# Patient Record
Sex: MALE | Race: WHITE
[De-identification: names, ages, dates, MRNs, and addresses within clinical notes are randomized per-mention and may not be internally consistent; named-entity substitution may affect disease eponyms.]

---

## 2017-10-27 ENCOUNTER — HOSPITAL ENCOUNTER (OUTPATIENT)
Dept: HOSPITAL 45 - C.EDA | Age: 82
Setting detail: OBSERVATION
LOS: 3 days | Discharge: HOME | End: 2017-10-30
Attending: FAMILY MEDICINE | Admitting: FAMILY MEDICINE
Payer: COMMERCIAL

## 2017-10-27 VITALS
WEIGHT: 207.45 LBS | HEIGHT: 67 IN | BODY MASS INDEX: 32.56 KG/M2 | BODY MASS INDEX: 32.56 KG/M2 | HEIGHT: 67 IN | WEIGHT: 207.45 LBS

## 2017-10-27 VITALS
OXYGEN SATURATION: 96 % | HEART RATE: 89 BPM | DIASTOLIC BLOOD PRESSURE: 92 MMHG | SYSTOLIC BLOOD PRESSURE: 170 MMHG | TEMPERATURE: 97.52 F

## 2017-10-27 DIAGNOSIS — I48.91: ICD-10-CM

## 2017-10-27 DIAGNOSIS — Z79.899: ICD-10-CM

## 2017-10-27 DIAGNOSIS — I10: ICD-10-CM

## 2017-10-27 DIAGNOSIS — N40.0: ICD-10-CM

## 2017-10-27 DIAGNOSIS — R07.2: Primary | ICD-10-CM

## 2017-10-27 DIAGNOSIS — Z79.82: ICD-10-CM

## 2017-10-27 DIAGNOSIS — F03.90: ICD-10-CM

## 2017-10-27 DIAGNOSIS — Z88.0: ICD-10-CM

## 2017-10-27 DIAGNOSIS — Z79.01: ICD-10-CM

## 2017-10-27 DIAGNOSIS — R79.89: ICD-10-CM

## 2017-10-27 LAB
ALBUMIN/GLOB SERPL: 1.1 {RATIO} (ref 0.9–2)
ALP SERPL-CCNC: 64 U/L (ref 45–117)
ALT SERPL-CCNC: 18 U/L (ref 12–78)
ANION GAP SERPL CALC-SCNC: 9 MMOL/L (ref 3–11)
APPEARANCE UR: CLEAR
AST SERPL-CCNC: 21 U/L (ref 15–37)
BASOPHILS # BLD: 0.04 K/UL (ref 0–0.2)
BASOPHILS NFR BLD: 0.5 %
BILIRUB UR-MCNC: (no result) MG/DL
BUN SERPL-MCNC: 21 MG/DL (ref 7–18)
BUN/CREAT SERPL: 16.4 (ref 10–20)
CALCIUM SERPL-MCNC: 8.5 MG/DL (ref 8.5–10.1)
CHLORIDE SERPL-SCNC: 105 MMOL/L (ref 98–107)
CO2 SERPL-SCNC: 27 MMOL/L (ref 21–32)
COLOR UR: YELLOW
COMPLETE: YES
CREAT CL PREDICTED SERPL C-G-VRATE: 46.7 ML/MIN
CREAT SERPL-MCNC: 1.26 MG/DL (ref 0.6–1.4)
EOSINOPHIL NFR BLD AUTO: 217 K/UL (ref 130–400)
GLOBULIN SER-MCNC: 3 GM/DL (ref 2.5–4)
GLUCOSE SERPL-MCNC: 102 MG/DL (ref 70–99)
HCT VFR BLD CALC: 37.1 % (ref 42–52)
IG%: 0.2 %
IMM GRANULOCYTES NFR BLD AUTO: 21.4 %
INR PPP: 1.1 (ref 0.9–1.1)
LYMPHOCYTES # BLD: 1.83 K/UL (ref 1.2–3.4)
MAGNESIUM SERPL-MCNC: 2 MG/DL (ref 1.8–2.4)
MANUAL MICROSCOPIC REQUIRED?: NO
MCH RBC QN AUTO: 31.2 PG (ref 25–34)
MCHC RBC AUTO-ENTMCNC: 33.4 G/DL (ref 32–36)
MCV RBC AUTO: 93.2 FL (ref 80–100)
MONOCYTES NFR BLD: 9.5 %
NEUTROPHILS # BLD AUTO: 2.8 %
NEUTROPHILS NFR BLD AUTO: 65.6 %
NITRITE UR QL STRIP: (no result)
PH UR STRIP: 5.5 [PH] (ref 4.5–7.5)
PMV BLD AUTO: 10.8 FL (ref 7.4–10.4)
POTASSIUM SERPL-SCNC: 3.7 MMOL/L (ref 3.5–5.1)
PROTHROMBIN TIME: 12 SECONDS (ref 9–12)
RBC # BLD AUTO: 3.98 M/UL (ref 4.7–6.1)
REVIEW REQ?: NO
SODIUM SERPL-SCNC: 141 MMOL/L (ref 136–145)
SP GR UR STRIP: 1.02 (ref 1–1.03)
TSH SERPL-ACNC: 5.15 UIU/ML (ref 0.3–4.5)
URINE BILL WITH OR WITHOUT MIC: (no result)
UROBILINOGEN UR-MCNC: (no result) MG/DL
WBC # BLD AUTO: 8.54 K/UL (ref 4.8–10.8)
ZZUR CULT IF INDIC CLEAN CATCH: NO

## 2017-10-27 NOTE — DIAGNOSTIC IMAGING REPORT
CHEST ONE VIEW PORTABLE



CLINICAL HISTORY: chest pain dyspnea



COMPARISON STUDY:  No previous studies for comparison.



FINDINGS: Mild cardiomegaly. The lungs are clear. Diaphragms are smooth.

Costophrenic angles are sharp. 



IMPRESSION:  Mild cardia megaly. Otherwise negative study. 











The above report was generated using voice recognition software.  It may contain

grammatical, syntax or spelling errors.









Electronically signed by:  Binu Raines M.D.

10/27/2017 5:35 PM



Dictated Date/Time:  10/27/2017 5:35 PM

## 2017-10-27 NOTE — HISTORY AND PHYSICAL
History & Physical


Date & Time of Service:


Oct 27, 2017 at 22:50


Chief Complaint:


Chest Pain


Primary Care Physician:


No Doctor, Assigned


History of Present Illness


Source:  patient, family


84-year-old male with a past medical history of dementia, atrial fibrillation, 

hypertension, BPH presented to the ER with complaints of intermittent chest 

pain that started this morning.


The patient stated that the pain was in the center of his chest with no 

radiation, about a 5/10 in severity, intermittent, sharp in nature.  It was 

associated with shortness of breath but he denied any palpitations, dizziness 

or lightheadedness, diaphoresis, nausea or vomiting.


He states that he occasionally gets heartburn and uses [place] for it.





Past Medical/Surgical History


Atrial fibrillation


Dementia


BPH





Social History


Smoking Status:  Former Smoker


Marital Status:  


Occupational Status:  retired





Allergies


Coded Allergies:  


     Penicillins (Verified  Allergy, Intermediate, SWELLING, 10/27/17)





Home Medications


Scheduled


Apixaban (Eliquis), 2.5 MG PO BID


Aspirin (Aspirin Ec), 81 MG PO DAILY


Atorvastatin (Lipitor), 10 MG PO DAILY


Cephalexin (Keflex), 1 CAP PO BID


Clonidine Hcl (Catapres), 1 TAB PO QID


Diltiazem Hcl Ext Rel (Tiazac), 360 MG PO DAILY


Donepezil Hydrochloride (Aricept), 10 MG PO HS


Finasteride (Proscar), 5 MG PO DAILY


Furosemide (Lasix), 40 MG PO DAILY


Memantine (Namenda), 10 MG PO BID


Nebivolol Hcl (Bystolic), 20 MG PO DAILY


Nitroglycerin (Nitrostat), 0.4 MG UT PRN


Terazosin (Hytrin), 5 MG PO HS





Review of Systems


Constitutional:  No fever, No chills


Eyes:  No worsening of vision


ENT:  No hearing loss


Respiratory:  No cough, No sputum, No shortness of breath


Cardiovascular:  + chest pain (intermittently), No edema, No palpitations


Abdomen:  No pain, No nausea, No vomiting, No diarrhea


Genitourinary - Male:  No hematuria, No dysuria


Neurologic:  + problem reported (dementia)


Integumentary:  No rash


Allergic / Immunologic:  No environmental allergies





Physical Exam


Vital Signs











  Date Time  Temp Pulse Resp B/P (MAP) Pulse Ox O2 Delivery O2 Flow Rate FiO2


 


10/27/17 22:00  82 20 163/75 97 Room Air  


 


10/27/17 21:07  88      


 


10/27/17 21:00  81 14 170/96 98 Room Air  


 


10/27/17 20:00  60 21 138/91 97 Room Air  


 


10/27/17 19:37  64 17 146/81 97 Room Air  


 


10/27/17 18:30  73 23 142/83 97 Room Air  


 


10/27/17 17:25  54 15 128/66 98 Room Air  


 


10/27/17 17:02  44      


 


10/27/17 16:37 36.5 45 22 107/53 97 Room Air  


 


10/27/17 16:37     97 Room Air  


 


10/27/17 16:37     97 Room Air  








General Appearance:  WD/WN, no apparent distress


ENT:  hearing grossly normal


Neck:  supple


Respiratory/Chest:  lungs clear, normal breath sounds, no respiratory distress


Cardiovascular:  regular rate, rhythm


Abdomen/GI:  normal bowel sounds, soft


Extremities/Musculoskelatal:  no pedal edema


Neurologic/Psych:  alert, normal mood/affect, oriented x 3


Skin:  normal color





Diagnostics


Laboratory Results





Results Past 24 Hours








Test


  10/27/17


16:52 10/27/17


18:15 Range/Units


 


 


White Blood Count 8.54  4.8-10.8  K/uL


 


Red Blood Count 3.98  4.7-6.1  M/uL


 


Hemoglobin 12.4  14.0-18.0  g/dL


 


Hematocrit 37.1  42-52  %


 


Mean Corpuscular Volume 93.2    fL


 


Mean Corpuscular Hemoglobin 31.2  25-34  pg


 


Mean Corpuscular Hemoglobin


Concent 33.4


  


  32-36  g/dl


 


 


Platelet Count 217  130-400  K/uL


 


Mean Platelet Volume 10.8  7.4-10.4  fL


 


Neutrophils (%) (Auto) 65.6   %


 


Lymphocytes (%) (Auto) 21.4   %


 


Monocytes (%) (Auto) 9.5   %


 


Eosinophils (%) (Auto) 2.8   %


 


Basophils (%) (Auto) 0.5   %


 


Neutrophils # (Auto) 5.60  1.4-6.5  K/uL


 


Lymphocytes # (Auto) 1.83  1.2-3.4  K/uL


 


Monocytes # (Auto) 0.81  0.11-0.59  K/uL


 


Eosinophils # (Auto) 0.24  0-0.5  K/uL


 


Basophils # (Auto) 0.04  0-0.2  K/uL


 


RDW Standard Deviation 45.7  36.4-46.3  fL


 


RDW Coefficient of Variation 13.5  11.5-14.5  %


 


Immature Granulocyte % (Auto) 0.2   %


 


Immature Granulocyte # (Auto) 0.02  0.00-0.02  K/uL


 


Prothrombin Time


  12.0


  


  9.0-12.0


SECONDS


 


Prothromb Time International


Ratio 1.1


  


  0.9-1.1  


 


 


Sodium Level 141  136-145  mmol/L


 


Potassium Level 3.7  3.5-5.1  mmol/L


 


Chloride Level 105    mmol/L


 


Carbon Dioxide Level 27  21-32  mmol/L


 


Anion Gap 9.0  3-11  mmol/L


 


Blood Urea Nitrogen 21  7-18  mg/dl


 


Creatinine


  1.26


  


  0.60-1.40


mg/dl


 


Est Creatinine Clear Calc


Drug Dose 46.7


  


   ml/min


 


 


Estimated GFR (


American) 60.3


  


   


 


 


Estimated GFR (Non-


American 52.0


  


   


 


 


BUN/Creatinine Ratio 16.4  10-20  


 


Random Glucose 102  70-99  mg/dl


 


Calcium Level 8.5  8.5-10.1  mg/dl


 


Magnesium Level 2.0  1.8-2.4  mg/dl


 


Total Bilirubin 0.4  0.2-1  mg/dl


 


Aspartate Amino Transf


(AST/SGOT) 21


  


  15-37  U/L


 


 


Alanine Aminotransferase


(ALT/SGPT) 18


  


  12-78  U/L


 


 


Alkaline Phosphatase 64    U/L


 


Troponin I < 0.015  0-0.045  ng/ml


 


Pro-B-Type Natriuretic Peptide 8998  0-1800  pg/ml


 


Total Protein 6.4  6.4-8.2  gm/dl


 


Albumin 3.4  3.4-5.0  gm/dl


 


Globulin 3.0  2.5-4.0  gm/dl


 


Albumin/Globulin Ratio 1.1  0.9-2  


 


Thyroid Stimulating Hormone


(TSH) 5.150


  


  0.300-4.500


uIu/ml











Diagnostic Radiology


CHEST ONE VIEW PORTABLE





CLINICAL HISTORY: chest pain dyspnea





COMPARISON STUDY:  No previous studies for comparison.





FINDINGS: Mild cardiomegaly. The lungs are clear. Diaphragms are smooth.


Costophrenic angles are sharp. 





IMPRESSION:  Mild cardia megaly. Otherwise negative study. 

















The above report was generated using voice recognition software.  It may contain


grammatical, syntax or spelling errors.














Electronically signed by:  Binu Raines M.D.


10/27/2017 5:35 PM





Dictated Date/Time:  10/27/2017 5:35 PM





EKG


Atrial fibrillation with slow ventricular response


Septal infarct , age undetermined


Abnormal ECG


No previous ECGs available





Impression


Assessment and Plan





84-year-old male with a past medical history of dementia, atrial fibrillation, 

hypertension, BPH presented to the ER with complaints of intermittent chest 

pain that started this morning.


Admitted to telemetry for chest pain rule out.





Precordial chest pain:


- EKG suggestive of atrial fibrillation


- Chest x-ray with mild cardiomegaly


- Initial troponin negative, trended every 8 hours


- Echo ordered


- Consider stress test


- Lipid panel ordered





A fibrillation:


- Continue diltiazem, Bystolic


- Anticoagulation with Eliquis 2.5 mg twice a day





Elevated TSH:


No documented history of hypothyroidism


- TSH at 5.15, free T4 ordered with a.m. labs





Dementia:


- Continue Aricept and Namenda





Hypertension:


- Continue clonidine


Takes Lasix for lower extremity swelling





BPH:


- Continue prazosin and finasteride








Resident Physician Supervision Note:





I was present with Dr. Ragland during the history and exam. I discussed the case 

with the resident and agree with the findings and plan as documented in the 

note.  Any exceptions or clarifications are listed here: 





83 y/o M Hx dementia, AF, HTN - anticoagulated with Eliquis - presents with 

intermittent CP x 1 day


Mild CHF is present on admission as well - does not have history of and AF is 

rate controlled - it is noted that full history may not be complete as we await 

outside records - Lasix appears on his med list reportedly for LE edema





OE


AAO x 2 


S1,2 irr


CTA


NT, ND


No CCE





P:


AF is rate controlled on admission and pt is anticoagulated 


We will obtain serial enzymes to r/o MI - monitor overnight on telemetry


We are pending records from [facility] at time of admission


Echo ordered due to CHF which may be of new onset - IV Lasix was provided in ER.





Documented By:  Calos Pérez





Level of Care


Telemetry





Advanced Directives


Existing Advance Directive:  No





Resuscitation Status


DO NOT RESUSCITATE





VTE Prophylaxis


VTE Risk Assessment Done? Y/N:  Yes


Risk Level:  Moderate


Given or contraindicated:  Other Anticoagulation (apixaban)





Resident Tracking


Resident Involvement:  Resident Care Provided


Care Provided:  Adult [facility]

## 2017-10-27 NOTE — EMERGENCY ROOM VISIT NOTE
History


Report prepared by El:  Anastacio Fuentes


Under the Supervision of:  KAYA ToddO.


First contact with patient:  16:41


Chief Complaint:  CHEST PAIN


Stated Complaint:  CHEST PAIN





History of Present Illness


The patient is an 84 year old male who presents to the Emergency Room with 

complaints of on and off sharp central chest pain that started around 0500 this 

morning. The patient additionally states that he was dizzy, and he was unable 

to walk his dog due to the pain. He states that he took aspirin this morning, 

and this helped as well as taking a nitro. He states that the pain does not go 

into his back or arms, and it does not change with position. The patient states 

that a couple of months ago he had a similar episode, and nothing was found. He 

states that he currently has a cold. He denies any recent change in his bowel 

movement or trouble urinating. The patient additionally states that he has not 

been doing any heavy lifting recently, and he states that occasionally gets 

heart burn, and it somewhat feels similar. Pt denies headache, change in vision

, fevers, shortness of breath, nausea, vomiting, diarrhea, pain with urination, 

and melena. The patient is a poor historian.





   Source of History:  patient


   Onset:  0500 this morning


   Position:  chest


   Quality:  sharp


   Timing:  other (on and off)


   Modifying Factors (Relieving):  other (aspirin and nitro)





Review of Systems


See HPI for pertinent positives & negatives. A total of 10 systems reviewed and 

were otherwise negative.





Past Medical & Surgical


Medical Problems:


(1) Dementia


(2) Precordial chest pain








Social History


Marital Status:  


Housing Status:  lives with family


Occupation Status:  retired





Current/Historical Medications


Scheduled


Apixaban (Eliquis), 2.5 MG PO BID


Aspirin (Aspirin Ec), 81 MG PO DAILY


Atorvastatin (Lipitor), 10 MG PO DAILY


Cephalexin (Keflex), 1 CAP PO BID


Clonidine Hcl (Catapres), 1 TAB PO QID


Diltiazem Hcl Ext Rel (Tiazac), 360 MG PO DAILY


Donepezil Hydrochloride (Aricept), 10 MG PO HS


Finasteride (Proscar), 5 MG PO DAILY


Furosemide (Lasix), 40 MG PO DAILY


Memantine (Namenda), 10 MG PO BID


Nebivolol Hcl (Bystolic), 20 MG PO DAILY


Nitroglycerin (Nitrostat), 0.4 MG UT PRN


Terazosin (Hytrin), 5 MG PO HS





Allergies


Coded Allergies:  


     Penicillins (Verified  Allergy, Intermediate, SWELLING, 10/27/17)





Physical Exam


Vital Signs











  Date Time  Temp Pulse Resp B/P (MAP) Pulse Ox O2 Delivery O2 Flow Rate FiO2


 


10/27/17 22:00  82 20 163/75 97 Room Air  


 


10/27/17 21:07  88      


 


10/27/17 21:00  81 14 170/96 98 Room Air  


 


10/27/17 20:00  60 21 138/91 97 Room Air  


 


10/27/17 19:37  64 17 146/81 97 Room Air  


 


10/27/17 18:30  73 23 142/83 97 Room Air  


 


10/27/17 17:25  54 15 128/66 98 Room Air  


 


10/27/17 17:02  44      


 


10/27/17 16:37 36.5 45 22 107/53 97 Room Air  


 


10/27/17 16:37     97 Room Air  


 


10/27/17 16:37     97 Room Air  











Physical Exam


GENERAL: alert, well appearing, well nourished, no distress, non-toxic 


EYE EXAM: normal conjunctiva, PERRL and EOM's grossly intact


OROPHARYNX: no exudate, no erythema, lips, buccal mucosa, and tongue normal and 

mucous membranes are moist


NECK: supple, no nuchal rigidity, no adenopathy, non-tender


LUNGS: Clear to auscultation. Normal chest wall mechanics


HEART: Slow and irregular.


CHEST: Minimal reproducible chest pain along the right sternal border.


ABDOMEN: abdomen soft, non-tender, normo-active bowel sounds, no masses, no 

rebound or guarding. 


BACK: Back is symmetrical on inspection and there is no deformity, no midline 

tenderness, no CVA tenderness. 


SKIN: no rashes and no bruising 


UPPER EXTREMITIES: upper extremities are grossly normal. 


LOWER EXTREMITIES: Trace to 1+ bilateral edema 


NEURO EXAM: Normal sensorium, cranial nerves II-XII grossly intact, normal 

speech,  no gross weakness of arms, no gross weakness of legs. Gross sensation 

intact.





Medical Decision & Procedures


ER Provider


Diagnostic Interpretation:


Radiology results have been interpreted by the radiologist and reviewed by me.








CHEST ONE VIEW PORTABLE





CLINICAL HISTORY: chest pain dyspnea





COMPARISON STUDY:  No previous studies for comparison.





FINDINGS: Mild cardiomegaly. The lungs are clear. Diaphragms are smooth.


Costophrenic angles are sharp. 





IMPRESSION:  Mild cardia megaly. Otherwise negative study. 








The above report was generated using voice recognition software.  It may contain


grammatical, syntax or spelling errors.








Electronically signed by:  Binu Raines M.D.


10/27/2017 5:35 PM





Dictated Date/Time:  10/27/2017 5:35 PM





Laboratory Results


10/27/17 16:52








Red Blood Count 3.98, Mean Corpuscular Volume 93.2, Mean Corpuscular Hemoglobin 

31.2, Mean Corpuscular Hemoglobin Concent 33.4, Mean Platelet Volume 10.8, 

Neutrophils (%) (Auto) 65.6, Lymphocytes (%) (Auto) 21.4, Monocytes (%) (Auto) 

9.5, Eosinophils (%) (Auto) 2.8, Basophils (%) (Auto) 0.5, Neutrophils # (Auto) 

5.60, Lymphocytes # (Auto) 1.83, Monocytes # (Auto) 0.81, Eosinophils # (Auto) 

0.24, Basophils # (Auto) 0.04





10/27/17 16:52

















Test


  10/27/17


16:52 10/27/17


22:17


 


White Blood Count


  8.54 K/uL


(4.8-10.8) 


 


 


Red Blood Count


  3.98 M/uL


(4.7-6.1) 


 


 


Hemoglobin


  12.4 g/dL


(14.0-18.0) 


 


 


Hematocrit 37.1 % (42-52)  


 


Mean Corpuscular Volume


  93.2 fL


() 


 


 


Mean Corpuscular Hemoglobin


  31.2 pg


(25-34) 


 


 


Mean Corpuscular Hemoglobin


Concent 33.4 g/dl


(32-36) 


 


 


Platelet Count


  217 K/uL


(130-400) 


 


 


Mean Platelet Volume


  10.8 fL


(7.4-10.4) 


 


 


Neutrophils (%) (Auto) 65.6 %  


 


Lymphocytes (%) (Auto) 21.4 %  


 


Monocytes (%) (Auto) 9.5 %  


 


Eosinophils (%) (Auto) 2.8 %  


 


Basophils (%) (Auto) 0.5 %  


 


Neutrophils # (Auto)


  5.60 K/uL


(1.4-6.5) 


 


 


Lymphocytes # (Auto)


  1.83 K/uL


(1.2-3.4) 


 


 


Monocytes # (Auto)


  0.81 K/uL


(0.11-0.59) 


 


 


Eosinophils # (Auto)


  0.24 K/uL


(0-0.5) 


 


 


Basophils # (Auto)


  0.04 K/uL


(0-0.2) 


 


 


RDW Standard Deviation


  45.7 fL


(36.4-46.3) 


 


 


RDW Coefficient of Variation


  13.5 %


(11.5-14.5) 


 


 


Immature Granulocyte % (Auto) 0.2 %  


 


Immature Granulocyte # (Auto)


  0.02 K/uL


(0.00-0.02) 


 


 


Prothrombin Time


  12.0 SECONDS


(9.0-12.0) 


 


 


Prothromb Time International


Ratio 1.1 (0.9-1.1) 


  


 


 


Anion Gap


  9.0 mmol/L


(3-11) 


 


 


Est Creatinine Clear Calc


Drug Dose 46.7 ml/min 


  


 


 


Estimated GFR (


American) 60.3 


  


 


 


Estimated GFR (Non-


American 52.0 


  


 


 


BUN/Creatinine Ratio 16.4 (10-20)  


 


Calcium Level


  8.5 mg/dl


(8.5-10.1) 


 


 


Magnesium Level


  2.0 mg/dl


(1.8-2.4) 


 


 


Total Bilirubin


  0.4 mg/dl


(0.2-1) 


 


 


Aspartate Amino Transf


(AST/SGOT) 21 U/L (15-37) 


  


 


 


Alanine Aminotransferase


(ALT/SGPT) 18 U/L (12-78) 


  


 


 


Alkaline Phosphatase


  64 U/L


() 


 


 


Troponin I


  < 0.015 ng/ml


(0-0.045) 


 


 


Pro-B-Type Natriuretic Peptide


  8998 pg/ml


(0-1800) 


 


 


Total Protein


  6.4 gm/dl


(6.4-8.2) 


 


 


Albumin


  3.4 gm/dl


(3.4-5.0) 


 


 


Globulin


  3.0 gm/dl


(2.5-4.0) 


 


 


Albumin/Globulin Ratio 1.1 (0.9-2)  


 


Thyroid Stimulating Hormone


(TSH) 5.150 uIu/ml


(0.300-4.500) 


 


 


Urine Color  YELLOW 


 


Urine Appearance  CLEAR (CLEAR) 


 


Urine pH  5.5 (4.5-7.5) 


 


Urine Specific Gravity


  


  1.019


(1.000-1.030)


 


Urine Protein  TRACE (NEG) 


 


Urine Glucose (UA)  NEG (NEG) 


 


Urine Ketones  TRACE (NEG) 


 


Urine Occult Blood  NEG (NEG) 


 


Urine Nitrite  NEG (NEG) 


 


Urine Bilirubin  NEG (NEG) 


 


Urine Urobilinogen  NEG (NEG) 


 


Urine Leukocyte Esterase  TRACE (NEG) 


 


Urine WBC (Auto)  1-5 /hpf (0-5) 


 


Urine RBC (Auto)  0-4 /hpf (0-4) 


 


Urine Hyaline Casts (Auto)  1-5 /lpf (0-5) 


 


Urine Epithelial Cells (Auto)


  


  10-20 /lpf


(0-5)


 


Urine Bacteria (Auto)  NEG (NEG) 








Laboratory results per my review.





Medications Administered











 Medications


  (Trade)  Dose


 Ordered  Sig/Yobani


 Route  Start Time


 Stop Time Status Last Admin


Dose Admin


 


 Al Hydroxide/Mg


 Hydroxide


  (Maalox Susp)  15 ml  NOW  STAT


 PO  10/27/17 16:59


 10/27/17 17:00 DC 10/27/17 17:24


15 ML


 


 Furosemide


  (Lasix Inj)  40 mg  NOW  STAT


 IV  10/27/17 21:39


 10/27/17 21:40 DC 10/27/17 21:52


40 MG











ECG


Indication:  chest pain


Rate (beats per minute):  52


Rhythm:  atrial fibrillation


Findings:  Q waves (lead 2), no acute ischemic change, other (Normal axis. 

Normal QRS and QTc.)





ED Course


1641: The patient was evaluated in room A12. A complete history and physical 

exam was performed. 





1659: Maalox Susp 15ml PO





1840: I reevaluated the patient, and he was doing well and does not want to be 

here anymore.





1845: I talked to the patient's son, Anastacio, over the phone, and he confirms that 

the patient does have dementia.He is unsure the last time that he saw a 

cardiologist, and he is unsure the last time that he had an echo. He doesn't 

recall the patient ever having A-fib. He additionally states that the patient 

has recently been more non-compliant with his medications.





2007: I talked to the patient's son at bedside, and we are trying to get the 

patient's records from [place] since he was just recently discharged.





2139: Lasix 40mg IV





2142: I reassessed the patient and discussed the treatment plan. He and the 

family were agreeable.





2146: I reviewed the patient's case with Dr. Pérez [facility].  He will evaluate the 

patient for further management.





Medical Decision


Differential diagnosis:


Etiologies such as cardiac ischemia, aortic dissection, pulmonary embolism, 

pneumonia, pneumothorax, musculoskeletal, infections, pericarditis, myocarditis

, esophageal rupture, gastrointestinal, as well as others were entertained. 





Patient with new-onset A. fib, although rate controlled.  Patient with atypical 

story for intermittent chest pain today, however although a poor historian 

given history of dementia.  Patient with elevated BNP, compared to old results 

obtained from [place], levels higher than those obtained there.  Neither 

records from [place] or family were able to elaborate on whether or not the 

patient had a cardiology evaluation or recent echo.  Patient's vital signs 

otherwise stable.  Patient with mildly elevated TSH, will need additional 

thyroid evaluation.  No evidence of acute thyroid storm is etiology for new 

atrial fibrillation.





Medication Reconcilliation


Current Medication List:  was personally reviewed by me





Blood Pressure Screening


Patient's blood pressure:  Elevated blood pressure


Monitored by the hospitalist





Consults


Time Called:  2143


Consulting Physician:  Dr. Pérez, [facility]


Returned Call:  2146


I reviewed the patient's case with Dr. Pérez.  He will evaluate the patient for 

further management.





Impression





 Primary Impression:  


 Atypical chest pain


 Additional Impressions:  


 New onset a-fib


 Elevated brain natriuretic peptide (BNP) level





Scribe Attestation


The scribe's documentation has been prepared under my direction and personally 

reviewed by me in its entirety. I confirm that the note above accurately 

reflects all work, treatment, procedures, and medical decision making performed 

by me.





Departure Information


Dispostion


Being Evaluated By Hospitalist





Patient Instructions


My [facility]





Problem Qualifiers

## 2017-10-28 VITALS
DIASTOLIC BLOOD PRESSURE: 68 MMHG | TEMPERATURE: 97.7 F | HEART RATE: 75 BPM | SYSTOLIC BLOOD PRESSURE: 118 MMHG | OXYGEN SATURATION: 97 %

## 2017-10-28 VITALS
SYSTOLIC BLOOD PRESSURE: 120 MMHG | DIASTOLIC BLOOD PRESSURE: 77 MMHG | HEART RATE: 71 BPM | TEMPERATURE: 98.24 F | OXYGEN SATURATION: 96 %

## 2017-10-28 VITALS
OXYGEN SATURATION: 96 % | DIASTOLIC BLOOD PRESSURE: 66 MMHG | TEMPERATURE: 98.24 F | SYSTOLIC BLOOD PRESSURE: 122 MMHG | HEART RATE: 65 BPM

## 2017-10-28 VITALS — SYSTOLIC BLOOD PRESSURE: 119 MMHG | HEART RATE: 54 BPM | DIASTOLIC BLOOD PRESSURE: 65 MMHG

## 2017-10-28 VITALS
OXYGEN SATURATION: 96 % | SYSTOLIC BLOOD PRESSURE: 146 MMHG | HEART RATE: 89 BPM | TEMPERATURE: 97.88 F | DIASTOLIC BLOOD PRESSURE: 82 MMHG

## 2017-10-28 VITALS — OXYGEN SATURATION: 97 %

## 2017-10-28 VITALS
HEART RATE: 92 BPM | TEMPERATURE: 97.7 F | OXYGEN SATURATION: 96 % | SYSTOLIC BLOOD PRESSURE: 171 MMHG | DIASTOLIC BLOOD PRESSURE: 82 MMHG

## 2017-10-28 LAB
ANION GAP SERPL CALC-SCNC: 3 MMOL/L (ref 3–11)
ANION GAP SERPL CALC-SCNC: 9 MMOL/L (ref 3–11)
BUN SERPL-MCNC: 18 MG/DL (ref 7–18)
BUN SERPL-MCNC: 20 MG/DL (ref 7–18)
BUN/CREAT SERPL: 14.9 (ref 10–20)
BUN/CREAT SERPL: 19.4 (ref 10–20)
CALCIUM SERPL-MCNC: 8.4 MG/DL (ref 8.5–10.1)
CALCIUM SERPL-MCNC: 8.6 MG/DL (ref 8.5–10.1)
CHLORIDE SERPL-SCNC: 105 MMOL/L (ref 98–107)
CHLORIDE SERPL-SCNC: 106 MMOL/L (ref 98–107)
CHOLEST/HDLC SERPL: 4.2 {RATIO}
CO2 SERPL-SCNC: 28 MMOL/L (ref 21–32)
CO2 SERPL-SCNC: 31 MMOL/L (ref 21–32)
CREAT CL PREDICTED SERPL C-G-VRATE: 44.1 ML/MIN
CREAT CL PREDICTED SERPL C-G-VRATE: 65.9 ML/MIN
CREAT SERPL-MCNC: 0.91 MG/DL (ref 0.6–1.4)
CREAT SERPL-MCNC: 1.36 MG/DL (ref 0.6–1.4)
EOSINOPHIL NFR BLD AUTO: 178 K/UL (ref 130–400)
GLUCOSE SERPL-MCNC: 110 MG/DL (ref 70–99)
GLUCOSE SERPL-MCNC: 89 MG/DL (ref 70–99)
GLUCOSE UR QL: 30 MG/DL
HCT VFR BLD CALC: 36.4 % (ref 42–52)
KETONES UR QL STRIP: 75 MG/DL
MAGNESIUM SERPL-MCNC: 2.1 MG/DL (ref 1.8–2.4)
MCH RBC QN AUTO: 31.3 PG (ref 25–34)
MCHC RBC AUTO-ENTMCNC: 33.8 G/DL (ref 32–36)
MCV RBC AUTO: 92.6 FL (ref 80–100)
NITRITE UR QL STRIP: 99 MG/DL (ref 0–150)
PH UR: 125 MG/DL (ref 0–200)
PHOSPHATE SERPL-MCNC: 3 MG/DL (ref 2.5–4.9)
PMV BLD AUTO: 10.3 FL (ref 7.4–10.4)
POTASSIUM SERPL-SCNC: 3.1 MMOL/L (ref 3.5–5.1)
POTASSIUM SERPL-SCNC: 4.2 MMOL/L (ref 3.5–5.1)
RBC # BLD AUTO: 3.93 M/UL (ref 4.7–6.1)
SODIUM SERPL-SCNC: 139 MMOL/L (ref 136–145)
SODIUM SERPL-SCNC: 143 MMOL/L (ref 136–145)
VERY LOW DENSITY LIPOPROT CALC: 20 MG/DL
WBC # BLD AUTO: 6.32 K/UL (ref 4.8–10.8)

## 2017-10-28 RX ADMIN — NITROGLYCERIN PRN MG: 0.4 TABLET SUBLINGUAL at 11:34

## 2017-10-28 RX ADMIN — CLONIDINE HYDROCHLORIDE SCH MG: 0.1 TABLET ORAL at 09:04

## 2017-10-28 RX ADMIN — ATORVASTATIN CALCIUM SCH MG: 10 TABLET, FILM COATED ORAL at 09:03

## 2017-10-28 RX ADMIN — CLONIDINE HYDROCHLORIDE SCH MG: 0.1 TABLET ORAL at 17:45

## 2017-10-28 RX ADMIN — APIXABAN SCH MG: 2.5 TABLET, FILM COATED ORAL at 21:00

## 2017-10-28 RX ADMIN — CLONIDINE HYDROCHLORIDE SCH MG: 0.1 TABLET ORAL at 13:11

## 2017-10-28 RX ADMIN — FINASTERIDE SCH MG: 5 TABLET, FILM COATED ORAL at 09:04

## 2017-10-28 RX ADMIN — FUROSEMIDE SCH MG: 40 TABLET ORAL at 09:04

## 2017-10-28 RX ADMIN — APIXABAN SCH MG: 2.5 TABLET, FILM COATED ORAL at 09:03

## 2017-10-28 RX ADMIN — Medication SCH MG: at 09:04

## 2017-10-28 RX ADMIN — MEMANTINE HYDROCHLORIDE SCH MG: 10 TABLET ORAL at 09:05

## 2017-10-28 RX ADMIN — CLONIDINE HYDROCHLORIDE SCH MG: 0.1 TABLET ORAL at 21:00

## 2017-10-28 RX ADMIN — DILTIAZEM HYDROCHLORIDE SCH MG: 180 CAPSULE, EXTENDED RELEASE ORAL at 09:03

## 2017-10-28 RX ADMIN — DONEPEZIL HYDROCHLORIDE SCH MG: 10 TABLET, FILM COATED ORAL at 21:00

## 2017-10-28 RX ADMIN — MEMANTINE HYDROCHLORIDE SCH MG: 10 TABLET ORAL at 21:00

## 2017-10-28 NOTE — ECHOCARDIOGRAM REPORT
*NOTICE TO RECEIVING PARTY AGENCY**  This information is strictly Confidential and protected under 
[place] law.  [place] law prohibits you from making any further disclosure of this 
information unless further disclosure is expressly permitted by the written consent of the person to 
whom it pertains or is authorized by law.  A general authorization for the release of medical or 
other information is not sufficient for this purpose.  Hospital accepts no responsibility if the 
information is made available to any other person, INCLUDING THE PATIENT.



Interpretation Summary

  *  Name: YAHIR WATSNO  Study Date: 10/28/2017 07:39 AM  BP: 146/82 mmHg

  *  MRN: [id number]  Patient Location: [facility]\S\N281\S\2  HR: 89

  *  : 1932 (M/[birth year]/yyyy)  Gender: Male  Height: 67 in

  *  Age: 84 yrs  Ethnicity: [place]  Weight: 198 lb

  *  Ordering Physician: Mai Barnes

  *  Performed By: Dulce Lo RDCS

  *  Accession# [id number]  Account# [id number]

  *  Reason For Study: CHEST PAIN

  *  BSA: 2.0 m2

  *  -- Conclusions --

  *  1. Normal LV size.  Mild concentric LVH.

  *  2. Normal LV systolic function.  LVEF 65-70%. No regional wall motion abnormalities.

  *  3. RV mildly dilated.  RV function normal.

  *  4. Borderline MV ALKA.  No LVOT obstruction.

  *  5. Moderate left, severe right atrial enlargement.

  *  6. Normal estimated PA and RA pressures.

  *  7. No prior studies for comparison.

Procedure Details

  *  A complete two-dimensional transthoracic echocardiogram was performed (2D, M-mode, Doppler and 
color flow Doppler).

  *  The study was technically difficult.

  *  A contrast injection of Definity was performed to improve assessment of LV function.

  *  Contrast was injected into an intravenous site in the left arm.

  *  One vial of Definity ultrasound contrast was diluted in normal saline to a total volume of 10 
ml.  A total of '3' ml of solution was administered during imaging.

  *  Lot # [id number] of Definity utilized for procedure.

  *  Expiration date 10/18.

  *  The attending nurse who injected the contrast agent was MARGARITA LIM RN.

Left Ventricle

  *  The left ventricle is grossly normal size.

  *  There is mild concentric left ventricular hypertrophy.

  *  Ejection Fraction = 65-70%.

  *  No regional wall motion abnormalities noted.

Right Ventricle

  *  The right ventricle is mildly dilated.

  *  The right ventricular systolic function is normal as assessed by tricuspid annular plane 
systolic excursion (TAPSE) (normal >1.5 cm).

Atria

  *  The left atrium is moderately dilated.

  *  The right atrium is severely dilated.

  *  No ASD detected; PFO is not assessed.

Mitral Valve

  *  Borderline MV ALKA without significant LVOT obstruction.

  *  There is no mitral valve stenosis.

  *  There is trace mitral regurgitation.

Tricuspid Valve

  *  The tricuspid valve is not well visualized.

  *  There is trace tricuspid regurgitation.

Aortic Valve

  *  The aortic valve opens well.

  *  No hemodynamically significant valvular aortic stenosis.

  *  There is no significant aortic regurgitation.

Pulmonic Valve

  *  The pulmonary valve is inadequately visualized, but the Doppler data is adequate for 
interpretation.

  *  There is no pulmonic valvular stenosis.

  *  There is no significant pulmonary regurgitation.

Great Vessels

  *  The aortic root and proximal ascending aorta are normal sized.

Pericardium/Pleural

  *  There is no pericardial effusion.

Great Vessels

  *  Normal inferior vena cava size and collapsability with sniff indicates a normal right atrial 
pressure of 3 mmHg

  *  There is no evidence of pulmonary hypertension. The PA systolic pressure is less than 36 mmHg.



MMode 2D Measurements and Calculations

IVSd 1.4 cm

IVSs 1.7 cm



LVIDd 4.4 cm

LVIDs 2.6 cm

LVPWd 1.3 cm

LVPWs 1.8 cm



IVS/LVPW 1.1 

FS 41.1 %

EDV(Teich) 86.5 ml

ESV(Teich) 24.1 ml

EF(Teich) 72.2 %



EDV(cubed) 83.8 ml

ESV(cubed) 17.1 ml

EF(cubed) 79.6 %

% IVS thick 15.2 %

% LVPW thick 36.6 %





LV mass(C)d 232.5 grams

LV mass(C)dI 115.5 grams/m\S\2

LV mass(C)s 169.0 grams

LV mass(C)sI 83.9 grams/m\S\2



SV(Teich) 62.5 ml

SI(Teich) 31.0 ml/m\S\2

SV(cubed) 66.7 ml

SI(cubed) 33.1 ml/m\S\2



ACS 1.2 cm



LVOT diam 1.9 cm

LVOT area 2.8 cm\S\2





LVAd ap4 26.4 cm\S\2

LVLd ap4 7.3 cm

EDV(MOD-sp4) 76.3 ml

EDV(sp4-el) 81.2 ml

LVAs ap4 11.2 cm\S\2

LVLs ap4 6.3 cm

ESV(MOD-sp4) 17.3 ml

ESV(sp4-el) 17.1 ml

EF(MOD-sp4) 77.3 %

EF(sp4-el) 79.0 %



LVAd ap2 25.8 cm\S\2

LVLd ap2 7.2 cm

EDV(MOD-sp2) 75.0 ml

EDV(sp2-el) 78.3 ml

LVAs ap2 11.0 cm\S\2

LVLs ap2 6.4 cm

ESV(MOD-sp2) 16.0 ml

ESV(sp2-el) 16.2 ml

EF(MOD-sp2) 78.7 %

EF(sp2-el) 79.3 %



LVLd %diff -1.39 %

EDV(MOD-bp) 75.7 ml

LVLs %diff 1.7 %

ESV(MOD-bp) 16.5 ml

EF(MOD-bp) 78.2 %



SV(MOD-sp4) 59.0 ml

SI(MOD-sp4) 29.3 ml/m\S\2





SV(MOD-sp2) 59.0 ml

SI(MOD-sp2) 29.3 ml/m\S\2



SV(MOD-bp) 59.2 ml

SI(MOD-bp) 29.4 ml/m\S\2



SV(sp4-el) 64.1 ml

SI(sp4-el) 31.9 ml/m\S\2



SV(sp2-el) 62.2 ml

SI(sp2-el) 30.9 ml/m\S\2







Doppler Measurements and Calculations

MV E max roberto 108.3 cm/sec



MV dec time 0.14 sec



Ao V2 max 122.3 cm/sec

Ao max PG 6.0 mmHg

Ao max PG (full) 3.5 mmHg

NAI(V,A) 1.8 cm\S\2

NAI(V,D) 1.8 cm\S\2



LV V1 max PG 2.5 mmHg





LV V1 max 78.5 cm/sec



PA V2 max 79.8 cm/sec

PA max PG 2.5 mmHg



TR max roberto 255.1 cm/sec

## 2017-10-28 NOTE — FAMILY MEDICINE PROGRESS NOTE
Progress Note


Date of Service


Oct 28, 2017.





Subjective


Pt evaluation today including:  conversation w/ patient, physical exam, chart 

review, lab review, review of studies, review of inpatient medication list


Pain:  epigastric pain


Voiding:  no voiding problems, no incontinence


 Overnight no acute events, Patient reports resolution of Chest pain from the 

previous day. He denies palpitation,  dyspnea,  N/V. He reports lightheadedness 

from previous day has resolved.





   Constitutional:  No fever, No chills


   Respiratory:  No cough, No sputum, No wheezing, No shortness of breath


   Cardiovascular:  No chest pain, No orthopnea, No PND, No edema, No 

palpitations


   Abdomen:  No pain, No nausea, No vomiting, No diarrhea


   Male :  No dysuria, No urinary frequency, No hematuria


   Skin:  No rash, No itch





Medications





Current Inpatient Medications








 Medications


  (Trade)  Dose


 Ordered  Sig/Yobani


 Route  Start Time


 Stop Time Status Last Admin


Dose Admin


 


 Acetaminophen


  (Tylenol Tab)  650 mg  Q4H  PRN


 PO  10/27/17 23:00


 11/26/17 22:59   


 


 


 Magnesium


 Hydroxide


  (Milk Of


 Magnesia Susp)  30 ml  Q12H  PRN


 PO  10/27/17 23:00


 11/26/17 22:59   


 


 


 Ondansetron HCl


  (Zofran Inj)  4 mg  Q6H  PRN


 IV  10/27/17 23:00


 11/26/17 22:59   


 


 


 Nitroglycerin


  (Nitrostat Tab)  0.4 mg  UD  PRN


 SL  10/27/17 23:00


 11/26/17 22:59  10/28/17 11:34


0.4 MG


 


 Morphine Sulfate


  (MoRPHine


 SULFATE INJ)  2 mg  Q30M  PRN


 IV  10/27/17 23:00


 11/10/17 22:59   


 


 


 Polyethylene


  (Miralax Powder


 Packet)  17 gm  DAILY  PRN


 PO  10/27/17 23:00


 11/26/17 22:59   


 


 


 Miscellaneous


  (Iv Fluids


 Completed)  1 ea  PRN  PRN


 N/A  10/28/17 00:15


 10/28/18 00:14   


 


 


 Apixaban


  (Eliquis Tab)  2.5 mg  BID


 PO  10/28/17 09:00


 11/27/17 08:59  10/28/17 21:00


2.5 MG


 


 Aspirin


  (Ecotrin Tab)  81 mg  DAILY


 PO  10/28/17 09:00


 11/27/17 08:59  10/28/17 09:04


81 MG


 


 Atorvastatin


 Calcium


  (Lipitor Tab)  10 mg  DAILY


 PO  10/28/17 09:00


 11/27/17 08:59  10/28/17 09:03


10 MG


 


 Clonidine HCl


  (Catapres Tab)  0.1 mg  QID


 PO  10/28/17 09:00


 11/27/17 08:59  10/28/17 17:45


0.1 MG


 


 Diltiazem HCl


  (TIAzac CAP)  360 mg  DAILY


 PO  10/28/17 09:00


 11/27/17 08:59  10/28/17 09:03


360 MG


 


 Donepezil HCl


  (Aricept Tab)  10 mg  HS


 PO  10/28/17 21:00


 11/27/17 20:59  10/28/17 21:00


10 MG


 


 Finasteride


  (Proscar Tab)  5 mg  DAILY


 PO  10/28/17 09:00


 11/27/17 08:59  10/28/17 09:04


5 MG


 


 Furosemide


  (Lasix Tab)  40 mg  DAILY


 PO  10/28/17 09:00


 11/27/17 08:59  10/28/17 09:04


40 MG


 


 Memantine


  (Namenda Tab)  10 mg  BID


 PO  10/28/17 09:00


 11/27/17 08:59  10/28/17 21:00


10 MG


 


 Terazosin HCl


  (Hytrin Cap)  5 mg  HS


 PO  10/28/17 21:00


 11/27/17 20:59   


 


 


 Pantoprazole


 Sodium


  (Protonix Tab)  40 mg  QAM


 PO  10/29/17 09:00


 11/28/17 08:59   


 











Objective


Vital Signs











  Date Time  Temp Pulse Resp B/P (MAP) Pulse Ox O2 Delivery O2 Flow Rate FiO2


 


10/28/17 20:57  54  119/65 (83)    


 


10/28/17 20:00      Room Air  


 


10/28/17 19:28 36.8 65 20 122/66 (84) 96 Room Air  


 


10/28/17 16:10 36.8 71 20 120/77 (91) 96 Nasal Cannula 2.0 


 


10/28/17 16:00      Room Air  


 


10/28/17 12:00      Room Air  


 


10/28/17 11:26 36.5 75 20 118/68 (85) 97 Room Air  


 


10/28/17 08:00      Room Air  


 


10/28/17 07:49 36.5 92 18 171/82 (111) 96 Room Air  


 


10/28/17 04:18 36.6 89 22 146/82 (103) 96 Room Air  


 


10/28/17 04:00     97 Room Air  


 


10/27/17 23:49      Room Air  


 


10/27/17 23:40 36.4 89 18 170/92 (118) 96 Room Air  


 


10/27/17 23:19  82 20 163/75 97   











Physical Exam


General Appearance:  WD/WN, no apparent distress


Eyes:  PERRL, EOMI


Neck:  supple, no carotid bruits, trachea midline


Respiratory/Chest:  lungs clear, normal breath sounds, no respiratory distress


Cardiovascular:  regular rate, rhythm, no edema, no murmur


Abdomen:  normal bowel sounds, non tender, soft, no organomegaly


Extremities:  no pedal edema, no calf tenderness


Neurologic/Psychiatric:  alert, normal mood/affect


Skin:  normal color, warm/dry, no rash





Laboratory Results





Results Past 24 Hours








Test


  10/27/17


22:17 10/28/17


04:25 10/28/17


12:34 10/28/17


14:34 Range/Units


 


 


Urine Color YELLOW     


 


Urine Appearance CLEAR    CLEAR  


 


Urine pH 5.5    4.5-7.5  


 


Urine Specific Gravity 1.019    1.000-1.030  


 


Urine Protein TRACE    NEG  


 


Urine Glucose (UA) NEG    NEG  


 


Urine Ketones TRACE    NEG  


 


Urine Occult Blood NEG    NEG  


 


Urine Nitrite NEG    NEG  


 


Urine Bilirubin NEG    NEG  


 


Urine Urobilinogen NEG    NEG  


 


Urine Leukocyte Esterase TRACE    NEG  


 


Urine WBC (Auto) 1-5    0-5  /hpf


 


Urine RBC (Auto) 0-4    0-4  /hpf


 


Urine Hyaline Casts (Auto) 1-5    0-5  /lpf


 


Urine Epithelial Cells (Auto) 10-20    0-5  /lpf


 


Urine Bacteria (Auto) NEG    NEG  


 


White Blood Count  6.32   4.8-10.8  K/uL


 


Red Blood Count  3.93   4.7-6.1  M/uL


 


Hemoglobin  12.3   14.0-18.0  g/dL


 


Hematocrit  36.4   42-52  %


 


Mean Corpuscular Volume  92.6     fL


 


Mean Corpuscular Hemoglobin  31.3   25-34  pg


 


Mean Corpuscular Hemoglobin


Concent 


  33.8


  


  


  32-36  g/dl


 


 


RDW Standard Deviation  44.4   36.4-46.3  fL


 


RDW Coefficient of Variation  13.2   11.5-14.5  %


 


Platelet Count  178   130-400  K/uL


 


Mean Platelet Volume  10.3   7.4-10.4  fL


 


Sodium Level  143  139 136-145  mmol/L


 


Potassium Level  3.1  4.2 3.5-5.1  mmol/L


 


Chloride Level  106  105   mmol/L


 


Carbon Dioxide Level  28  31 21-32  mmol/L


 


Anion Gap  9.0  3.0 3-11  mmol/L


 


Blood Urea Nitrogen  18  20 7-18  mg/dl


 


Creatinine


  


  0.91


  


  1.36


  0.60-1.40


mg/dl


 


Est Creatinine Clear Calc


Drug Dose 


  65.9


  


  44.1


   ml/min


 


 


Estimated GFR (


American) 


  89.4


  


  55.0


   


 


 


Estimated GFR (Non-


American 


  77.1


  


  47.4


   


 


 


BUN/Creatinine Ratio  19.4  14.9 10-20  


 


Random Glucose  89  110 70-99  mg/dl


 


Calcium Level  8.4  8.6 8.5-10.1  mg/dl


 


Troponin I  < 0.015 < 0.015 < 0.015 0-0.045  ng/ml


 


Triglycerides Level  99   0-150  mg/dl


 


Cholesterol Level  125   0-200  mg/dl


 


HDL Cholesterol  30    mg/dl


 


LDL Cholesterol, Calculated  75    mg/dl


 


VLDL Cholesterol, Calculated  20    mg/dl


 


Cholesterol/HDL Ratio  4.2    


 


Free Thyroxine


  


  1.17


  


  


  0.80-1.60


ng/dl


 


Phosphorus Level    3.0 2.5-4.9  mg/dl


 


Magnesium Level    2.1 1.8-2.4  mg/dl











Assessment and Plan


83 yo M Hx Afib, HTN,  BPH, dementia GERD, p/w episode of Acute Chest pain, EKG 

consistent with AFib , Negative Troponin. 





Chest Pain


Differential diagnoses includes but is not limited to acute coronary syndrome, 

myocardial infarction, pericarditis, pulmonary embolus, musculoskeletal, GERD  


-No evidence of acute ischemia on EKG


-Echo:  Mild concentric LVH. Normal LV systolic function.  LVEF 65-70%. No 

regional wall motion abnormalities.


Epigastric tenderness exam could indicate GERD, gastritis, peptic ulcer








Atrial Fibrillation/CHF


- TSH elevated at 5.14 ,  free T4 wnl


-Continue Eliquis





HTN, Hx LE Edema


-Continue Clonidine, Diltiazem, 





Hypokalemia


- K 3,1


-repleted orally 40 meq overnight, 20 meq today


F/u BMP





Dementia


- Continue Aricept, Namenda





BPH


- Continue Prazosin, Finasteride





DVT Prophylaxis


- already on ELiquis





Addendum


Patient had multiple pauses on telemetry this afternoon with HR going as low as 

41.


EKG showed Afib with slow ventricular rate, without acute ischemic findings.


Stat Troponin was neg. Repeat electrolytes were unremarkable. K had improved


Continue to monitor. Patient may need to transfer to PCU.. 


Held Nebivolol


Continued [facility] stay due to:  abnormal vital signs, other (Needs inpatient 

stress test)


Discharge planning:  home





Resident Tracking


Resident Involvement:  Resident Care Provided


Care Provided:  Adult Hospital Medicine

## 2017-10-29 VITALS
DIASTOLIC BLOOD PRESSURE: 65 MMHG | SYSTOLIC BLOOD PRESSURE: 108 MMHG | TEMPERATURE: 98.24 F | HEART RATE: 50 BPM | OXYGEN SATURATION: 96 %

## 2017-10-29 VITALS
HEART RATE: 85 BPM | TEMPERATURE: 98.06 F | OXYGEN SATURATION: 95 % | DIASTOLIC BLOOD PRESSURE: 52 MMHG | SYSTOLIC BLOOD PRESSURE: 155 MMHG

## 2017-10-29 VITALS
OXYGEN SATURATION: 93 % | HEART RATE: 56 BPM | TEMPERATURE: 97.88 F | DIASTOLIC BLOOD PRESSURE: 56 MMHG | SYSTOLIC BLOOD PRESSURE: 109 MMHG

## 2017-10-29 VITALS
OXYGEN SATURATION: 95 % | SYSTOLIC BLOOD PRESSURE: 89 MMHG | DIASTOLIC BLOOD PRESSURE: 49 MMHG | HEART RATE: 82 BPM | TEMPERATURE: 98.42 F

## 2017-10-29 VITALS
HEART RATE: 87 BPM | DIASTOLIC BLOOD PRESSURE: 92 MMHG | SYSTOLIC BLOOD PRESSURE: 145 MMHG | TEMPERATURE: 97.52 F | OXYGEN SATURATION: 95 %

## 2017-10-29 VITALS — SYSTOLIC BLOOD PRESSURE: 144 MMHG | DIASTOLIC BLOOD PRESSURE: 88 MMHG | HEART RATE: 87 BPM

## 2017-10-29 VITALS
TEMPERATURE: 98.06 F | HEART RATE: 66 BPM | DIASTOLIC BLOOD PRESSURE: 71 MMHG | OXYGEN SATURATION: 97 % | SYSTOLIC BLOOD PRESSURE: 126 MMHG

## 2017-10-29 VITALS — SYSTOLIC BLOOD PRESSURE: 144 MMHG | DIASTOLIC BLOOD PRESSURE: 89 MMHG | HEART RATE: 81 BPM

## 2017-10-29 VITALS
SYSTOLIC BLOOD PRESSURE: 124 MMHG | OXYGEN SATURATION: 96 % | HEART RATE: 77 BPM | DIASTOLIC BLOOD PRESSURE: 63 MMHG | TEMPERATURE: 97.52 F

## 2017-10-29 LAB
ANION GAP SERPL CALC-SCNC: 6 MMOL/L (ref 3–11)
BUN SERPL-MCNC: 21 MG/DL (ref 7–18)
BUN/CREAT SERPL: 19.9 (ref 10–20)
CALCIUM SERPL-MCNC: 8.3 MG/DL (ref 8.5–10.1)
CHLORIDE SERPL-SCNC: 105 MMOL/L (ref 98–107)
CO2 SERPL-SCNC: 28 MMOL/L (ref 21–32)
CREAT CL PREDICTED SERPL C-G-VRATE: 56.6 ML/MIN
CREAT SERPL-MCNC: 1.06 MG/DL (ref 0.6–1.4)
EOSINOPHIL NFR BLD AUTO: 180 K/UL (ref 130–400)
GLUCOSE SERPL-MCNC: 96 MG/DL (ref 70–99)
HCT VFR BLD CALC: 36.4 % (ref 42–52)
MAGNESIUM SERPL-MCNC: 2 MG/DL (ref 1.8–2.4)
MCH RBC QN AUTO: 31.3 PG (ref 25–34)
MCHC RBC AUTO-ENTMCNC: 33.5 G/DL (ref 32–36)
MCV RBC AUTO: 93.3 FL (ref 80–100)
PHOSPHATE SERPL-MCNC: 2.1 MG/DL (ref 2.5–4.9)
PMV BLD AUTO: 10.6 FL (ref 7.4–10.4)
POTASSIUM SERPL-SCNC: 3.8 MMOL/L (ref 3.5–5.1)
RBC # BLD AUTO: 3.9 M/UL (ref 4.7–6.1)
SODIUM SERPL-SCNC: 139 MMOL/L (ref 136–145)
WBC # BLD AUTO: 6.15 K/UL (ref 4.8–10.8)

## 2017-10-29 RX ADMIN — FINASTERIDE SCH MG: 5 TABLET, FILM COATED ORAL at 07:51

## 2017-10-29 RX ADMIN — DILTIAZEM HYDROCHLORIDE SCH MG: 180 CAPSULE, EXTENDED RELEASE ORAL at 07:50

## 2017-10-29 RX ADMIN — APIXABAN SCH MG: 2.5 TABLET, FILM COATED ORAL at 07:49

## 2017-10-29 RX ADMIN — MEMANTINE HYDROCHLORIDE SCH MG: 10 TABLET ORAL at 21:47

## 2017-10-29 RX ADMIN — Medication SCH MG: at 07:50

## 2017-10-29 RX ADMIN — DONEPEZIL HYDROCHLORIDE SCH MG: 10 TABLET, FILM COATED ORAL at 21:47

## 2017-10-29 RX ADMIN — PANTOPRAZOLE SCH MG: 40 TABLET, DELAYED RELEASE ORAL at 07:49

## 2017-10-29 RX ADMIN — CLONIDINE HYDROCHLORIDE SCH MG: 0.1 TABLET ORAL at 11:51

## 2017-10-29 RX ADMIN — CLONIDINE HYDROCHLORIDE SCH MG: 0.1 TABLET ORAL at 21:46

## 2017-10-29 RX ADMIN — MEMANTINE HYDROCHLORIDE SCH MG: 10 TABLET ORAL at 07:49

## 2017-10-29 RX ADMIN — APIXABAN SCH MG: 2.5 TABLET, FILM COATED ORAL at 21:47

## 2017-10-29 RX ADMIN — ATORVASTATIN CALCIUM SCH MG: 10 TABLET, FILM COATED ORAL at 07:50

## 2017-10-29 RX ADMIN — CLONIDINE HYDROCHLORIDE SCH MG: 0.1 TABLET ORAL at 07:50

## 2017-10-29 RX ADMIN — CLONIDINE HYDROCHLORIDE SCH MG: 0.1 TABLET ORAL at 17:05

## 2017-10-29 RX ADMIN — NITROGLYCERIN PRN MG: 0.4 TABLET SUBLINGUAL at 04:43

## 2017-10-29 RX ADMIN — FUROSEMIDE SCH MG: 40 TABLET ORAL at 07:50

## 2017-10-29 RX ADMIN — NEBIVOLOL HYDROCHLORIDE SCH MG: 5 TABLET ORAL at 11:51

## 2017-10-29 NOTE — FAMILY MEDICINE PROGRESS NOTE
Progress Note


Date of Service


Oct 29, 2017.





Subjective


Pt evaluation today including:  conversation w/ patient, physical exam, chart 

review, lab review, review of studies, review of inpatient medication list


Pain:  intermittent epigastric pain


PO Intake:  adequate


Voiding:  no voiding problems


Overnight, patient did have intermittent 3 sec pauses in telemetry until 20:00.

  Patient subsequently ohad period of tachycardia range from . Patiaviva lopez assocated palpitation, He did have pain in the epigastrium similar to 

previous.  He denies Nausea/vomiting, SOB, diaphoresis.





   Constitutional:  No fever, No chills


   Respiratory:  No cough, No wheezing, No shortness of breath


   Cardiovascular:  + chest pain, No edema, No palpitations


   Abdomen:  No pain, No nausea, No vomiting


   Male :  No dysuria, No urinary frequency


   Neurologic:  No weakness, No numbness/tingling


   Skin:  No rash, No itch





Medications





Current Inpatient Medications








 Medications


  (Trade)  Dose


 Ordered  Sig/Yobani


 Route  Start Time


 Stop Time Status Last Admin


Dose Admin


 


 Acetaminophen


  (Tylenol Tab)  650 mg  Q4H  PRN


 PO  10/27/17 23:00


 11/26/17 22:59   


 


 


 Magnesium


 Hydroxide


  (Milk Of


 Magnesia Susp)  30 ml  Q12H  PRN


 PO  10/27/17 23:00


 11/26/17 22:59   


 


 


 Ondansetron HCl


  (Zofran Inj)  4 mg  Q6H  PRN


 IV  10/27/17 23:00


 11/26/17 22:59   


 


 


 Nitroglycerin


  (Nitrostat Tab)  0.4 mg  UD  PRN


 SL  10/27/17 23:00


 11/26/17 22:59  10/29/17 04:43


0.4 MG


 


 Morphine Sulfate


  (MoRPHine


 SULFATE INJ)  2 mg  Q30M  PRN


 IV  10/27/17 23:00


 11/10/17 22:59   


 


 


 Polyethylene


  (Miralax Powder


 Packet)  17 gm  DAILY  PRN


 PO  10/27/17 23:00


 11/26/17 22:59   


 


 


 Miscellaneous


  (Iv Fluids


 Completed)  1 ea  PRN  PRN


 N/A  10/28/17 00:15


 10/28/18 00:14   


 


 


 Apixaban


  (Eliquis Tab)  2.5 mg  BID


 PO  10/28/17 09:00


 11/27/17 08:59  10/29/17 07:49


2.5 MG


 


 Aspirin


  (Ecotrin Tab)  81 mg  DAILY


 PO  10/28/17 09:00


 11/27/17 08:59  10/29/17 07:50


81 MG


 


 Atorvastatin


 Calcium


  (Lipitor Tab)  10 mg  DAILY


 PO  10/28/17 09:00


 11/27/17 08:59  10/29/17 07:50


10 MG


 


 Clonidine HCl


  (Catapres Tab)  0.1 mg  QID


 PO  10/28/17 09:00


 11/27/17 08:59  10/29/17 07:50


0.1 MG


 


 Diltiazem HCl


  (TIAzac CAP)  360 mg  DAILY


 PO  10/28/17 09:00


 11/27/17 08:59  10/29/17 07:50


360 MG


 


 Donepezil HCl


  (Aricept Tab)  10 mg  HS


 PO  10/28/17 21:00


 11/27/17 20:59  10/28/17 21:00


10 MG


 


 Finasteride


  (Proscar Tab)  5 mg  DAILY


 PO  10/28/17 09:00


 11/27/17 08:59  10/29/17 07:51


5 MG


 


 Furosemide


  (Lasix Tab)  40 mg  DAILY


 PO  10/28/17 09:00


 11/27/17 08:59  10/29/17 07:50


40 MG


 


 Memantine


  (Namenda Tab)  10 mg  BID


 PO  10/28/17 09:00


 11/27/17 08:59  10/29/17 07:49


10 MG


 


 Terazosin HCl


  (Hytrin Cap)  5 mg  HS


 PO  10/28/17 21:00


 11/27/17 20:59   


 


 


 Pantoprazole


 Sodium


  (Protonix Tab)  40 mg  QAM


 PO  10/29/17 09:00


 11/28/17 08:59  10/29/17 07:49


40 MG











Objective


Vital Signs











  Date Time  Temp Pulse Resp B/P (MAP) Pulse Ox O2 Delivery O2 Flow Rate FiO2


 


10/29/17 07:15 36.7 85 20 155/52 (86) 95 Room Air  


 


10/29/17 05:02  87  144/88 (106)    


 


10/29/17 04:43  81  144/89 (107)    


 


10/29/17 04:00      Room Air  


 


10/29/17 03:16 36.4 87 22 145/92 (109) 95 Room Air  


 


10/29/17 00:16 36.4 77 18 124/63 (83) 96 Room Air  


 


10/29/17 00:00      Room Air  


 


10/28/17 20:57  54  119/65 (83)    


 


10/28/17 20:00      Room Air  


 


10/28/17 19:28 36.8 65 20 122/66 (84) 96 Room Air  


 


10/28/17 16:10 36.8 71 20 120/77 (91) 96 Nasal Cannula 2.0 


 


10/28/17 16:00      Room Air  


 


10/28/17 12:00      Room Air  


 


10/28/17 11:26 36.5 75 20 118/68 (85) 97 Room Air  











Physical Exam


Notes:


GENERAL: alert, no distress, non-toxic 


EYE EXAM: normal conjunctiva, PERRL and EOM's grossly intact


OROPHARYNX: no exudate, no erythema, lips, buccal mucosa, and tongue normal and 

mucous membranes are moist


NECK: supple, no nuchal rigidity, no adenopathy, non-tender


LUNGS: Clear to auscultation. Normal chest wall mechanics


HEART: no murmurs, S1 normal and S2 normal 


ABDOMEN: abdomen soft, non-tender, normo-active bowel sounds, no masses, no 

rebound or guarding. 


SKIN: no rashes and no bruising 


UPPER EXTREMITIES: upper extremities are grossly normal. 


LOWER EXTREMITIES: No pitting edema.


NEURO EXAM: AOx2 (person/place), cranial nerves II-XII grossly intact, normal 

speech





Laboratory Results





Results Past 24 Hours








Test


  10/28/17


12:34 10/28/17


14:34 10/29/17


05:22 Range/Units


 


 


Troponin I < 0.015 < 0.015  0-0.045  ng/ml


 


Sodium Level  139 139 136-145  mmol/L


 


Potassium Level  4.2 3.8 3.5-5.1  mmol/L


 


Chloride Level  105 105   mmol/L


 


Carbon Dioxide Level  31 28 21-32  mmol/L


 


Anion Gap  3.0 6.0 3-11  mmol/L


 


Blood Urea Nitrogen  20 21 7-18  mg/dl


 


Creatinine


  


  1.36


  1.06


  0.60-1.40


mg/dl


 


Est Creatinine Clear Calc


Drug Dose 


  44.1


  56.6


   ml/min


 


 


Estimated GFR (


American) 


  55.0


  74.3


   


 


 


Estimated GFR (Non-


American 


  47.4


  64.1


   


 


 


BUN/Creatinine Ratio  14.9 19.9 10-20  


 


Random Glucose  110 96 70-99  mg/dl


 


Calcium Level  8.6 8.3 8.5-10.1  mg/dl


 


Phosphorus Level  3.0 2.1 2.5-4.9  mg/dl


 


Magnesium Level  2.1 2.0 1.8-2.4  mg/dl


 


White Blood Count   6.15 4.8-10.8  K/uL


 


Red Blood Count   3.90 4.7-6.1  M/uL


 


Hemoglobin   12.2 14.0-18.0  g/dL


 


Hematocrit   36.4 42-52  %


 


Mean Corpuscular Volume   93.3   fL


 


Mean Corpuscular Hemoglobin   31.3 25-34  pg


 


Mean Corpuscular Hemoglobin


Concent 


  


  33.5


  32-36  g/dl


 


 


RDW Standard Deviation   46.0 36.4-46.3  fL


 


RDW Coefficient of Variation   13.4 11.5-14.5  %


 


Platelet Count   180 130-400  K/uL


 


Mean Platelet Volume   10.6 7.4-10.4  fL











Assessment and Plan








83 yo M Hx Afib, HTN,  BPH, dementia GERD, p/w episode of Acute Chest pain, EKG 

consistent with AFib , Negative Troponin. 





Chest Pain


Differential diagnoses includes but is not limited to acute coronary syndrome, 

myocardial infarction, pericarditis, pulmonary embolus, musculoskeletal, GERD  


-No evidence of acute ischemia on EKG


-Echo:  Mild concentric LVH. Normal LV systolic function.  LVEF 65-70%. No 

regional wall motion abnormalities.


Epigastric tenderness exam could indicate GERD, gastritis, peptic ulcer


-10/28: multiple pauses on telemetry this afternoon with HR going as low as 41, 

Troponin was neg. Repeat electrolytes were unremarkable., Echo unremarkable


- HR has improved, although patient has had episodes of tachycardia


-Cardiology consulted, awaiting recommendations


-Continue to Monitor, consider adding b-blocker back ( Nebivolol) 





HTN, Hx LE Edema


-Continue Clonidine, Diltiazem, 


-Nebivolol remains held





Hypokalemia


- K 3,1


-repleted orally 40 meq overnight, 20 meq today


F/u BMP





Dementia


- Continue Aricept, Namenda





BPH


- Continue Prazosin, Finasteride





DVT Prophylaxis


- already on ELiquis


Continued [facility] stay due to:  abnormal vital signs


Discharge planning:  home





Resident Tracking


Resident Involvement:  Resident Care Provided


Care Provided:  Adult Hospital Medicine

## 2017-10-29 NOTE — CARDIOLOGY CONSULTATION
DATE OF CONSULTATION:  10/29/2017

 

TIME:  11:23 a.m.

 

CONSULTING PHYSICIAN:  Timothy Cruz MD.

 

REASON FOR CONSULTATION:  Atrial fibrillation and bradycardia.

 

HISTORY OF PRESENT ILLNESS:  Mr. Barnes is a pleasant 84-year-old gentleman

with history significant for atrial fibrillation, hypertension and dementia. 

He was admitted to [facility] on 10/27/2017 with chest

pain.

 

When asked about his chest pain he could not give much details.  He says he

has chest pain in the center of his chest for 5 or 6 years.  When asked to

further characterize it, he said it is just chest pain.  He could not give

any further details.  He has poor memory and is overall a poor historian. 

His wife, Kymberly, was contacted via telephone.  She agrees that for the past

several years, he has had intermittent chest pain.  He has been prescribed

nitroglycerin by his primary care doctor and she gives him nitroglycerin

periodically.  She typically gives him 1 dose in 15 minutes later asks if he

wants a second and he typically says no.  She says that he has not had a

cardiac catheterization and has not been diagnosed with coronary artery

disease.

 

His wife states that because she is at [facility], her son, Anastacio, went to

their house to take out the garbage and found Mr. Barnes with the telephone

saying he had to call [phone]  because of chest pain.

 

He has a known history of atrial fibrillation and is on Eliquis 2.5 mg twice

daily.  He is also on Bystolic 20 mg daily and diltiazem 360 mg daily. 

During this hospitalization, he was noted to have some intermittent

bradycardia with transient pauses up to 3 seconds in duration.  For this

reason, the hospitalist service had to discontinue Bystolic.  Since then, his

heart rate has been more tachycardic at times with heart rates up into the

100s.  He has also been hypertensive throughout this hospital stay with

intermittent episodes of normotension.

 

He currently denies any symptoms.  He denies palpitations, chest pain,

syncope, near syncope, shortness of breath or edema.  He denies bleeding. 

Once again, he is a poor historian.

 

REVIEW OF SYSTEMS:  As above and otherwise reported as negative by the

patient.

 

PAST MEDICAL HISTORY:

1.  Dementia.

2.  Atrial fibrillation on anticoagulation therapy.

3.  Hypertension.

4.  BPH.

5.  Chest pain.

 

HOME MEDICATIONS:  Include Eliquis 2.5 mg twice daily, aspirin 81 mg daily,

Lipitor 10 mg daily, clonidine 1 tab p.o. q.i.d., Diltiazem 360 mg daily,

Aricept 10 mg at bedtime, Lasix 40 mg daily, Namenda 10 mg b.i.d., Bystolic

20 mg daily, nitroglycerin p.r.n., Hytrin 5 mg p.o. at bedtime.

 

CURRENT INPATIENT MEDICATIONS:  Include Eliquis 2.5 mg twice daily, aspirin

81 mg daily, Lipitor 10 mg daily, clonidine 0.1 mg p.o. q.i.d., Diltiazem 360

mg daily, Aricept 10 mg at bedtime, Proscar 5 mg daily, Lasix 40 mg p.o.

daily, Namenda 10 mg p.o. b.i.d., Protonix 40 mg daily, Hytrin 5 mg 2 at

bedtime.

 

SOCIAL HISTORY:  Quit smoking years ago.  No alcohol.  Lives at home with his

wife named Kymberly.  Two sons, Anastacio and Harjit.  He is currently alone in his

hospital room.

 

FAMILY HISTORY:  Denies premature CAD.

 

PHYSICAL EXAMINATION:

VITAL SIGNS:  Temperature 36.7 degrees, heart rate 85 beats per minute,

respiration rate 20, blood pressure 155/52 mmHg, oxygen saturation 95% on

room air.  Weight 94.5 kg.

GENERAL:  No acute distress.  He is alert and oriented to self and place.  He

did not know the year or month.

HEENT:  Anicteric sclerae.

NECK:  Thick, no appreciable JVD.  No bruits.  Normal carotid upstrokes

bilaterally.

CARDIAC EXAMINATION:  PMI was nonpalpable.  There was no ventricular heave,

irregularly irregular, normal S1, S2.  No audible murmurs, rubs or gallops.

LUNGS:  Clear to auscultation bilaterally without wheezes, rales or rhonchi.

ABDOMEN:  Soft, nontender, nondistended, normoactive bowel sounds, no bruits.

EXTREMITIES:  No cyanosis.  2+ radial pulses bilaterally.  1+ dorsalis pedis

pulse bilaterally.  Trace bilateral lower extremity edema.

PSYCHIATRIC:  Affect appears appropriate.

CHEST:  Tender to palpation along the xiphoid process.  He states this is

similar to the chest pain that he presented with however, his memory is poor.

 

LABORATORY DATA:  ECG 10/27/2017 at 1641 personally reviewed.  Atrial

fibrillation at 52 beats per minute.  Possible septal infarct.  Repeat ECG

10/28/2017 at 08:02 a.m., atrial fibrillation at 102 beats per minute.  PVCs

versus aberrantly conducted complex, nonspecific T-wave abnormality.  ECG

10/28/2017 at 10:54 a.m., atrial fibrillation with rapid ventricular response

at 101 beats per minute.  Telemetry personally reviewed.  Pauses up to 3

seconds noted with the last one being at 20:54 p.m. yesterday.  Otherwise,

his heart rate has trended upward.  His last dose of Bystolic was yesterday

morning.

 

Echocardiogram performed 10/28/2017 as interpreted by Dr. Rasmussen:  Normal LV

systolic function.  EF 65-70%.  No wall motion abnormalities.  Mildly dilated

RV with normal systolic function.  Borderline systolic anterior motion of the

mitral valve with no obstruction.  Moderate left atrial dilation.  Severe

right atrial dilation.  Normal estimated RVSP.

 

ASSESSMENT AND PLAN:

1.  Atrial fibrillation:  He appears to have permanent atrial fibrillation

according to conversation with his wife.  He is tachycardic at times. 

Therefore, rather than stopping beta blocker altogether, will reorder

Bystolic at a reduced dose.  It does not appear as though he is symptomatic

with his pauses of 3 seconds.  There is no clear indication for pacemaker at

this time as we will hopefully be able to reduce periods of bradycardia and

tachycardia with a lower dose of beta blocker.  If longer pauses occur or he

is symptomatic with the tachybrady syndrome, despite further adjustments in

medications, could then consider in the future, but for now, adjust beta

blocker as above.  Agree with stroke risk reduction with anticoagulation

therapy.  We will increase Eliquis to 5 mg twice daily given appropriate

renal function.

2.  Chest pain:  Difficult to understand if his chest pain represents angina.

 It appears as though he has been getting nitroglycerin at home by his wife

as prescribed by his PCP.  There has not been any documented coronary artery

disease based on his wife's conversation.  We will not pursue exercise stress

echo which has been ordered by the primary service given the fact that he

does walk with a cane and there is concern that he may fall and incur some

type of injury.  Would avoid dobutamine stress echo given the fact that he

has atrial fibrillation with rapid ventricular response at times.  Could

consider myocardial perfusion study; however, his wife states that she would

not want him to pursue cardiac catheterization either way and would prefer

medical management.  Therefore, we will start isosorbide mononitrate incase

his chest pain represents ischemic heart disease and restart beta blocker as

above.  He does have reproducible chest pain and therefore his chest pain may

be musculoskeletal.  His poor memory makes it difficult to have a better

understanding of his chest pain triggers and alleviating factors.

3.  Hypertension:  Restarting Bystolic.

4.  Disposition:  If he is doing well tomorrow with appropriate heart rate

and has no further issues with chest pain despite ambulation while on

isosorbide mononitrate, would not plan for any further cardiology

intervention at this time.  His wife has some concerns about his code status

given the fact that he is an organ donor.  This was relayed to Dr. Cruz, so

that he could have a conversation with her about this.  She informed me that

she wants him to be a full code.  This was also relayed to Dr. Cruz, so that

he can make appropriate changes after he further discusses his organ donor

status as per the patient's wife request.

 

The patient's care/plan of care has been discussed with Dr. Cruz of the

primary hospitalist service.  Please call for any further questions or

concerns.

## 2017-10-30 VITALS
HEART RATE: 89 BPM | SYSTOLIC BLOOD PRESSURE: 143 MMHG | DIASTOLIC BLOOD PRESSURE: 65 MMHG | TEMPERATURE: 97.34 F | OXYGEN SATURATION: 96 %

## 2017-10-30 VITALS
SYSTOLIC BLOOD PRESSURE: 163 MMHG | OXYGEN SATURATION: 96 % | DIASTOLIC BLOOD PRESSURE: 99 MMHG | TEMPERATURE: 97.34 F | HEART RATE: 73 BPM

## 2017-10-30 VITALS
OXYGEN SATURATION: 96 % | SYSTOLIC BLOOD PRESSURE: 107 MMHG | TEMPERATURE: 97.88 F | DIASTOLIC BLOOD PRESSURE: 65 MMHG | HEART RATE: 84 BPM

## 2017-10-30 VITALS — OXYGEN SATURATION: 96 %

## 2017-10-30 VITALS
SYSTOLIC BLOOD PRESSURE: 107 MMHG | HEART RATE: 84 BPM | OXYGEN SATURATION: 95 % | DIASTOLIC BLOOD PRESSURE: 65 MMHG | TEMPERATURE: 97.88 F

## 2017-10-30 VITALS
TEMPERATURE: 97.16 F | HEART RATE: 79 BPM | DIASTOLIC BLOOD PRESSURE: 51 MMHG | OXYGEN SATURATION: 99 % | SYSTOLIC BLOOD PRESSURE: 115 MMHG

## 2017-10-30 RX ADMIN — Medication SCH MG: at 08:41

## 2017-10-30 RX ADMIN — ATORVASTATIN CALCIUM SCH MG: 10 TABLET, FILM COATED ORAL at 08:41

## 2017-10-30 RX ADMIN — DILTIAZEM HYDROCHLORIDE SCH MG: 180 CAPSULE, EXTENDED RELEASE ORAL at 08:42

## 2017-10-30 RX ADMIN — NEBIVOLOL HYDROCHLORIDE SCH MG: 5 TABLET ORAL at 08:44

## 2017-10-30 RX ADMIN — APIXABAN SCH MG: 2.5 TABLET, FILM COATED ORAL at 08:45

## 2017-10-30 RX ADMIN — MEMANTINE HYDROCHLORIDE SCH MG: 10 TABLET ORAL at 08:43

## 2017-10-30 RX ADMIN — PANTOPRAZOLE SCH MG: 40 TABLET, DELAYED RELEASE ORAL at 08:44

## 2017-10-30 RX ADMIN — FINASTERIDE SCH MG: 5 TABLET, FILM COATED ORAL at 08:44

## 2017-10-30 RX ADMIN — CLONIDINE HYDROCHLORIDE SCH MG: 0.1 TABLET ORAL at 13:10

## 2017-10-30 RX ADMIN — CLONIDINE HYDROCHLORIDE SCH MG: 0.1 TABLET ORAL at 08:43

## 2017-10-30 RX ADMIN — FUROSEMIDE SCH MG: 40 TABLET ORAL at 08:44

## 2017-10-30 NOTE — DISCHARGE INSTRUCTIONS
Discharge Instructions


Date of Service


Oct 30, 2017.





Admission


Reason for Admission:  Precordial Chest Pain





Discharge


Discharge Diagnosis / Problem:  precordial chest pain





Discharge Goals


Goal(s):  Decrease discomfort





Activity Recommendations


Activity Limitations:  resume your previous activity





.





Instructions / Follow-Up


Instructions / Follow-Up


You were admitted with complaints of chest pain and was evaluated with serial 

troponin enzymes which were all negative.  You had an echocardiogram which 

revealed no wall motion changes. 


During your hospital stay, the heart rate had increased and you had a few 

episodes of decreased heart rate too.


Cardiology was consulted and they have been some changes made to your current 

medications which are listed below.





Chest pain:


- Recommended to use Imdur ER 30 mg daily





Atrial fibrillation:


- Continue diltiazem 360 mg daily


- Bystolic dosage has been decreased to 10 mg from 20 milligrams


-Please use Eliquis 5 mg twice daily instead of 2.5 mg twice daily





Dementia:


- Continue Aricept and Namenda





Hypertension:


- Continue clonidine





He may continue to take  Lasix for lower extremity swelling





BPH:


- Continue prazosin and finasteride





Please follow up with the primary care physician within the next few days.


You may follow up with cardiology for managing her atrial fibrillation if you 

wish or may follow-up with your PCP





Current Hospital Diet


Patient's current hospital diet: AHA Diet (Heart Healthy)





Discharge Diet


Recommended Diet:  AHA Diet (Heart Healthy)





Pending Studies


Studies pending at discharge:  no (I's and esophagitis patient)





Laboratory Results





Lipid Panel








Test


  10/28/17


04:25 Range/Units


 


 


Triglycerides Level 99  0-150  mg/dl


 


Cholesterol Level 125  0-200  mg/dl


 


HDL Cholesterol 30   mg/dl


 


Cholesterol/HDL Ratio 4.2   


 


LDL Cholesterol, Calculated 75   mg/dl











Medical Emergencies








.


Who to Call and When:





Medical Emergencies:  If at any time you feel your situation is an emergency, 

please call 911 immediately.





.





Non-Emergent Contact


Non-Emergency issues call your:  Primary Care Provider





.


.








"Provider Documentation" section prepared by Mai Barnes.








.





Consultant Recommendations


Consultant Recommendations:


Eliquis dosage increased to 5 mg twice daily





Imdur 30 mg extended release added daily for chest pain





Bystolic dosage decreased from 20 mg daily to 10 mg daily





VTE Core Measure


Inpt VTE Proph given/why not?:  Other Anticoagulation (apixaban)

## 2017-10-30 NOTE — DISCHARGE SUMMARY
Discharge Summary


Date of Service


Oct 30, 2017.


 (Mai Barnes MD)





Discharge Summary


Admission Date:


Oct 27, 2017 at 22:50


Discharge Date:  Oct 30, 2017


Discharge Disposition:  Home


Principal Diagnosis:  Precordial chest pain


Problems/Secondary Diagnoses:


Atrial fibrillation with pauses.


Consultations:


Cardiology


 (Mai Barnes MD)





Medication Reconciliation


New Medications:  


Apixaban (Eliquis) 2.5 Mg Tab


5 MG PO BID for 30 Days, #120 TAB





Nebivolol HCl (Bystolic) 5 Mg Tab


10 MG PO QAM for 30 Days, #60 TAB





 


Continued Medications:  


Aspirin (Aspirin Ec) 81 Mg Tab


81 MG PO DAILY





Atorvastatin (Lipitor) 10 Mg Tab


10 MG PO DAILY, TAB





Clonidine Hcl (Catapres) 0.1 Mg Tab


1 TAB PO QID, TAB





Diltiazem Hcl Ext Rel (Tiazac) 360 Mg Capcr


360 MG PO DAILY, CAP





Donepezil Hydrochloride (Aricept) 10 Mg Tab


10 MG PO HS, TAB





Finasteride (Proscar) 5 Mg Tab


5 MG PO DAILY, TAB





Furosemide (Lasix) 40 Mg Tab


40 MG PO DAILY, TAB





Memantine (Namenda) 10 Mg Tab


10 MG PO BID, TAB





Terazosin (Hytrin) 5 Mg Cap


5 MG PO HS, CAP





 


Discontinued Medications:  


Apixaban (Eliquis) 2.5 Mg Tab


2.5 MG PO BID, TAB





Cephalexin (Keflex) 500 Mg Cap


1 CAP PO BID, CAP





Nebivolol Hcl (Bystolic) 20 Mg Tab


20 MG PO DAILY, TAB





Nitroglycerin (Nitrostat) 0.4 Mg Tab


0.4 MG UT PRN, BTL











Discharge Exam


Doing better today.  Denies any chest pain, palpitations, shortness of breath


Review of Systems:  


   Constitutional:  No fever, No chills


   Eyes:  No worsening of vision


   ENT:  No hearing loss


   Respiratory:  No cough, No sputum


   Cardiovascular:  No chest pain


   Abdomen:  No pain, No nausea


   Musculoskeletal:  No joint pain


   Genitourinary - Male:  No hematuria, No dysuria


   Neurologic:  No memory loss


   Psychiatric:  No depression symptoms


   Endocrine:  No fatigue


   Hematologic / Lymphatic:  No abnormal bleeding/bruising


Physical Exam:  


   General Appearance:  WD/WN, no apparent distress


   Eyes:  normal inspection


   ENT:  normal ENT inspection, hearing grossly normal


   Neck:  supple


   Respiratory/Chest:  chest non-tender, lungs clear, normal breath sounds


   Cardiovascular:  + irregularly irregular


   Abdomen / GI:  normal bowel sounds, non tender, soft


   Extremities:  no pedal edema


   Neurologic/Psychiatric:  alert, normal mood/affect, oriented x 3


   Skin:  normal color


 (Mai Barnes MD)





Hospital Course


84-year-old male with a past medical history of dementia, atrial fibrillation, 

hypertension, BPH presented to the ER with complaints of intermittent chest 

pain that started on the day of admission.  He was admitted to telemetry as a 

chest pain rule out.  EKG was suggestive of atrial fibrillation.  Serial 

troponins were drawn and found to be negative.  Echo revealed Normal LV 

systolic function.  EF 65-70%.  No wall motion abnormalities.


Excess stress test was not done considering physical inability, debridement 

stress test was avoided considering history of A. fib and after discussion with 

his wife myocardial perfusion scan was not performed as they preferred 

conservative management for CAD.  He was started on Imdur 30 mg extended 

release daily.


He also has a history of atrial fibrillation which was managed on diltiazem and 

20 mg of Bystolic.  Cardiology was consulted after he was found to have 

symptomatic pauses and his Bystolic dosage was decreased to 10 mg.  His Eliquis 

dosage was also increased to 5 mg twice daily from 2.5 mg twice daily.


The rest of his home medications were unchanged.





Was recommended to follow-up with his PCP in about a week and with cardiology 

for managing his atrial fibrillation if he wished.


Total Time Spent:  Less than 30 minutes


This includes examination of the patient, discharge planning, medication 

reconciliation, and communication with other providers.


 (Mai Barnes MD)





Resident Physician Supervision Note:





I was present with Dr. Barnes during the history and exam. I discussed the case 

with the resident and agree with the findings and plan as documented in the 

note.  I also discussed the case and disposition with cardiology. 





Documented By:  Coy Dave


Total Time Spent:  Less than 30 minutes


 (Coy Dave.,D.O.)


Discharge Instructions


Please refer to the electronic Patient Visit Report (Discharge Instructions) 

for additional information.


 (Mai Barnes MD)





Follow-Up


Follow up with PCP in about a week


 (Mai Barnes MD)

## 2017-10-30 NOTE — CARDIOLOGY PROGRESS NOTE
DATE: 10/30/2017

 

TIME:  10:32 a.m.

 

SUBJECTIVE:  Mr. Barnes denies chest pain, shortness of breath, syncope, near

syncope, or palpitations.  He would like to go home.

 

OBJECTIVE:

VITAL SIGNS:  Temperature 36.3 degrees, heart rate 89 beats per minute,

respiration rate 20, blood pressure 143/65 mmHg.  Oxygen saturation 96% on

room air.  Weight 94.1 kg.

GENERAL:  No acute distress.  He is alert.

NECK:  No JVD appreciated.

CARDIAC:  No ventricular heave, irregularly irregular, normal S1, S2.  There

were no audible murmurs, rubs or gallops.

LUNGS:  Decreased breath sounds but otherwise clear.

ABDOMEN:  Soft, nontender, nondistended.  Normoactive bowel sounds.

EXTREMITIES:  Trace bilateral lower extremity edema.  No cyanosis.

PSYCHIATRIC:  Affect appears appropriate.

 

MEDICATIONS:  Include aspirin 81 mg daily, Eliquis 5 mg p.o. b.i.d., Lipitor

10 mg daily, clonidine 0.1 mg p.o. q.i.d., Diltiazem 360 mg daily, Aricept 10

mg 2 at bedtime, Lasix 40 mg p.o. daily, isosorbide mononitrate 30 mg p.o.

daily, Namenda 10 mg p.o. b.i.d., Bystolic 10 mg daily, Protonix 40 mg daily,

and terazosin 5 mg at bedtime.

 

LABORATORY DATA:  No new labs  today.

 

Telemetry personally reviewed.  No significant pauses.  Heart rate trend

overall appears improved.

 

ASSESSMENT AND PLAN:

1.  Atrial fibrillation:  He appears to have permanent atrial fibrillation. 

He is asymptomatic.  He had pauses of 3 seconds on Bystolic 20 mg and

diltiazem 360 mg, but appeared to be asymptomatic.  He is now on Bystolic 10

mg with the same dose of diltiazem 360 mg and his heart rate trend appears

appropriate without significant pauses or significant tachycardia.  We would

continue current regimen.  This can be followed further as an outpatient. 

Continue anticoagulation for stroke risk reduction.

2.  Chest pain:  As mentioned yesterday, it is difficult to know details

about his chest pain given his dementia.  His wife stated that she or he

would not want cardiac catheterization and that they would prefer medical

therapy.  Therefore, stress testing was not performed.  Continue isosorbide

mononitrate to see if it offers any benefit.  It is not clear if his chest

pain is ischemic in origin, but if nitrite therapy improves his quality of

life, could continue.  Continue beta blocker.

3.  Hypertension:  Blood pressure is mostly normotensive since yesterday

afternoon with one documented mild hypertension and one mild hypotension. 

Continue current regimen as tolerated.

4.  Disposition:  Further long term evaluation of his heart rate trends can

be performed as an outpatient.  His primary care physician has been treating

his atrial fibrillation.  If his PCP would like further assistance or if the

patient or family would like cardiology follow, we would be happy to see him

in the outpatient setting.  Plan of care has been discussed with primary

hospitalist service, Dr. Dave.

## 2017-12-01 ENCOUNTER — HOSPITAL ENCOUNTER (INPATIENT)
Dept: HOSPITAL 45 - C.EDB | Age: 82
LOS: 4 days | Discharge: NURSING, REHAB OR LONG-TERM CARE | DRG: 640 | End: 2017-12-05
Attending: FAMILY MEDICINE | Admitting: HOSPITALIST
Payer: COMMERCIAL

## 2017-12-01 VITALS
WEIGHT: 201.72 LBS | WEIGHT: 201.72 LBS | HEIGHT: 66 IN | BODY MASS INDEX: 32.42 KG/M2 | BODY MASS INDEX: 32.42 KG/M2 | HEIGHT: 66 IN

## 2017-12-01 VITALS
DIASTOLIC BLOOD PRESSURE: 84 MMHG | TEMPERATURE: 98.06 F | HEART RATE: 71 BPM | SYSTOLIC BLOOD PRESSURE: 144 MMHG | OXYGEN SATURATION: 98 %

## 2017-12-01 DIAGNOSIS — Z95.0: ICD-10-CM

## 2017-12-01 DIAGNOSIS — I25.10: ICD-10-CM

## 2017-12-01 DIAGNOSIS — I10: ICD-10-CM

## 2017-12-01 DIAGNOSIS — G93.40: ICD-10-CM

## 2017-12-01 DIAGNOSIS — Z88.0: ICD-10-CM

## 2017-12-01 DIAGNOSIS — Z79.82: ICD-10-CM

## 2017-12-01 DIAGNOSIS — I95.89: ICD-10-CM

## 2017-12-01 DIAGNOSIS — Z87.891: ICD-10-CM

## 2017-12-01 DIAGNOSIS — E86.0: Primary | ICD-10-CM

## 2017-12-01 DIAGNOSIS — N40.0: ICD-10-CM

## 2017-12-01 DIAGNOSIS — F03.90: ICD-10-CM

## 2017-12-01 LAB
ALP SERPL-CCNC: 72 U/L (ref 45–117)
ALT SERPL-CCNC: 16 U/L (ref 12–78)
ANION GAP SERPL CALC-SCNC: 7 MMOL/L (ref 3–11)
AST SERPL-CCNC: 13 U/L (ref 15–37)
BASE EXCESS STD BLDV CALC-SCNC: 1.8 MEQ/L
BASOPHILS # BLD: 0.03 K/UL (ref 0–0.2)
BASOPHILS NFR BLD: 0.3 %
BUN SERPL-MCNC: 19 MG/DL (ref 7–18)
BUN/CREAT SERPL: 17.1 (ref 10–20)
CALCIUM SERPL-MCNC: 8.3 MG/DL (ref 8.5–10.1)
CHLORIDE SERPL-SCNC: 105 MMOL/L (ref 98–107)
CO2 SERPL-SCNC: 26 MMOL/L (ref 21–32)
COMPLETE: YES
CREAT CL PREDICTED SERPL C-G-VRATE: 54.8 ML/MIN
CREAT SERPL-MCNC: 1.09 MG/DL (ref 0.6–1.4)
EOSINOPHIL NFR BLD AUTO: 268 K/UL (ref 130–400)
GLUCOSE SERPL-MCNC: 109 MG/DL (ref 70–99)
HCT VFR BLD CALC: 36.3 % (ref 42–52)
IG%: 0.3 %
IMM GRANULOCYTES NFR BLD AUTO: 10.4 %
LYMPHOCYTES # BLD: 1.22 K/UL (ref 1.2–3.4)
MCH RBC QN AUTO: 30.8 PG (ref 25–34)
MCHC RBC AUTO-ENTMCNC: 32.5 G/DL (ref 32–36)
MCV RBC AUTO: 94.8 FL (ref 80–100)
MONOCYTES NFR BLD: 7.7 %
NEUTROPHILS # BLD AUTO: 2 %
NEUTROPHILS NFR BLD AUTO: 79.3 %
PCO2 BLDV: 49 MMHG (ref 38–50)
PMV BLD AUTO: 10.5 FL (ref 7.4–10.4)
PO2 BLDV: 22 MMHG
POTASSIUM SERPL-SCNC: 4.1 MMOL/L (ref 3.5–5.1)
RBC # BLD AUTO: 3.83 M/UL (ref 4.7–6.1)
SAO2 % BLDV FROM PO2: < 60 %
SODIUM SERPL-SCNC: 138 MMOL/L (ref 136–145)
WBC # BLD AUTO: 11.72 K/UL (ref 4.8–10.8)

## 2017-12-01 RX ADMIN — SODIUM CHLORIDE SCH MLS/HR: 900 INJECTION, SOLUTION INTRAVENOUS at 23:27

## 2017-12-01 NOTE — DIAGNOSTIC IMAGING REPORT
CHEST ONE VIEW PORTABLE



HISTORY: Atypical  CHEST PAIN



COMPARISON: Chest 11/8/2017.



FINDINGS: Left-sided single lead pacemaker. The heart remains borderline

enlarged. The right lung is clear. Linear retrocardiac density persists and

favors subsegmental atelectasis or scarring. No new focal lung consolidations.

No evidence for pulmonary edema.



IMPRESSION:

No significant change compared to the prior study. No acute process.







Electronically signed by:  Felix Severino M.D.

12/1/2017 6:33 PM



Dictated Date/Time:  12/1/2017 6:32 PM

## 2017-12-01 NOTE — DIAGNOSTIC IMAGING REPORT
CT SCAN OF THE BRAIN WITHOUT IV CONTRAST



CLINICAL HISTORY: Syncope.



COMPARISON STUDY:  No priors.



TECHNIQUE: Unenhanced axial CT scan of the brain is performed from the vertex to

the skull base.



CT DOSE: 623.48 mGy.cm



FINDINGS:



Brain parenchyma: There are age-related involutional changes noting  mild

subcortical and periventricular microangiopathic change. There is no hemorrhage,

mass effect, or evidence of acute territorial ischemia by CT criteria.

Gray-white matter is preserved. No extra-axial fluid collection is seen.



Ventricles, sulci, cisterns: Prominent secondary to involutional change.



Intracranial vasculature: There is atherosclerotic calcification of the

cavernous carotid and vertebral arteries.



Calvarium: Unremarkable.



Sinuses and mastoids: The visualized paranasal sinuses are clear. There are

small mastoid effusions.



Orbits: The bony orbits are grossly intact. There is evidence of bilateral

ocular lens surgery.





IMPRESSION: There is no hemorrhage, mass effect, or evidence of acute

territorial ischemia by CT criteria.







Electronically signed by:  Douglas Blunt M.D.

12/1/2017 7:16 PM



Dictated Date/Time:  12/1/2017 7:14 PM

## 2017-12-01 NOTE — EMERGENCY ROOM VISIT NOTE
History


Report prepared by El:  Jarad Garsia


Under the Supervision of:  Dr. Joe Ptoter M.D.


First contact with patient:  17:44


Stated Complaint:  CHEST PAIN/BRONCHITIS





History of Present Illness


The patient is an 85 year old male who presents to the Emergency Room with 

complaints of constant chest pain beginning an hour ago. The patient states he 

fell an hour ago and picked himself up to the couch. He denies losing 

consciousness, hitting his head, and remembering why he fell. The wife notes 

she called EMS because he developed chest pain. EMS states the patient was 

unresponsive and pale upon their arrival sitting on the couch. They report a 

sternum rub brought the patient back to consciousness. EMS notes his blood 

sugar was normal. The patient states he is on Elequis for a-fib. He also denies 

fevers, chills, congestion, burning with urination, and a history of blood clots

, COPD, or asthma. He report he typically has high blood pressure, but it is 

low in the room. The patient notes he has been experiencing a lot of coughing, 

and he was feeling well prior to today. He states he had a pacemaker implanted 

a month ago.





   Source of History:  patient, spouse/significant other, EMS


   Onset:  one hour ago


   Position:  chest


   Timing:  constant


   Associated Symptoms:  + LOC, + cough, No fevers, No chills, No urinary 

symptoms


Note:


Denies: congestion





Review of Systems


See HPI for pertinent positives and negatives.  A total of ten systems were 

reviewed and were otherwise negative.





Past Medical & Surgical


Medical Problems:


(1) Dementia


(2) Precordial chest pain


(3) Tachy-oly syndrome








Family History





Patient reports no known family medical history.





Social History


Smoking Status:  Former Smoker


Marital Status:  


Housing Status:  lives with family


Occupation Status:  retired





Current/Historical Medications


Scheduled


Apixaban (Eliquis), 5 MG PO BID


Aspirin (Aspirin Ec), 81 MG PO DAILY


Atorvastatin (Lipitor), 10 MG PO DAILY


Clonidine Hcl (Catapres), 1 TAB PO TID


Diltiazem Hcl Ext Rel (Tiazac), 360 MG PO DAILY


Donepezil Hydrochloride (Aricept), 10 MG PO HS


Finasteride (Proscar), 5 MG PO DAILY


Furosemide (Lasix), 20 MG PO DAILY


Isosorbide Mononitrate (Isosorbide Mononitrate ER), 30 MG PO QAM


Memantine (Namenda), 10 MG PO BID


Metoprolol Tartrate (Lopressor) (Lopressor), 50 MG PO BID


Terazosin (Hytrin), 5 MG PO HS





Allergies


Coded Allergies:  


     Penicillins (Verified  Allergy, Intermediate, SWELLING, 11/2/17)





Physical Exam


Vital Signs











  Date Time  Temp Pulse Resp B/P (MAP) Pulse Ox O2 Delivery O2 Flow Rate FiO2


 


12/1/17 21:28  72      


 


12/1/17 20:55  74 16 119/70 96 Room Air  


 


12/1/17 19:35  60 16 108/60 96 Room Air  


 


12/1/17 18:51  62 16 105/59 96 Room Air  


 


12/1/17 18:26  61 14 101/59 96 Room Air  


 


12/1/17 18:15  60  83/55    


 


12/1/17 18:13  60 14 79/52 95 Room Air  


 


12/1/17 18:03  60 18 83/50 96 Room Air  


 


12/1/17 17:56      Room Air  


 


12/1/17 17:55     95 Room Air  


 


12/1/17 17:51 36.6 72 12 88/59 95 Room Air  


 


12/1/17 17:45  74      











Physical Exam


GENERAL: Awake, alert, fatigue, chronically ill-appearing, in no distress


HENT: Normocephalic, atraumatic. Dry and cracked mucous membranes, otherwise 

unremarkable


EYES: Normal conjunctiva. Sclera non-icteric.


NECK: Supple. No nuchal rigidity. FROM. No JVD.


RESPIRATORY: Diminished breath sounds at bases, scant scattered rhonchi 

anteriorly.


CARDIAC: Regular rate, normal rhythm. Extremities warm and well perfused. 

Pulses equal.


ABDOMEN: Soft, non-distended. No tenderness to palpation. No rebound or 

guarding. No masses.


RECTAL: Deferred.


MUSCULOSKELETAL: Chest examination reveals no tenderness. The back is 

symmetrical on inspection without obvious abnormality. There is no CVA 

tenderness to palpation. No joint edema. 


LOWER EXTREMITIES: Calves are equal size bilaterally and non-tender. Scant 

bilateral edema. No discoloration. 


NEURO: Normal sensorium. No sensory or motor deficits noted. 


SKIN: No rash or jaundice noted. Cool. Dry. Pale.





Medical Decision & Procedures


ER Provider


Diagnostic Interpretation:


Radiology results as stated below per my review and radiologist interpretation: 





CT SCAN OF THE BRAIN WITHOUT IV CONTRAST





CLINICAL HISTORY: Syncope.





COMPARISON STUDY:  No priors.





TECHNIQUE: Unenhanced axial CT scan of the brain is performed from the vertex to


the skull base.





CT DOSE: 623.48 mGy.cm





FINDINGS:





Brain parenchyma: There are age-related involutional changes noting  mild


subcortical and periventricular microangiopathic change. There is no hemorrhage,


mass effect, or evidence of acute territorial ischemia by CT criteria.


Gray-white matter is preserved. No extra-axial fluid collection is seen.





Ventricles, sulci, cisterns: Prominent secondary to involutional change.





Intracranial vasculature: There is atherosclerotic calcification of the


cavernous carotid and vertebral arteries.





Calvarium: Unremarkable.





Sinuses and mastoids: The visualized paranasal sinuses are clear. There are


small mastoid effusions.





Orbits: The bony orbits are grossly intact. There is evidence of bilateral


ocular lens surgery.








IMPRESSION: There is no hemorrhage, mass effect, or evidence of acute


territorial ischemia by CT criteria.











Electronically signed by:  Douglas Blunt M.D.


12/1/2017 7:16 PM





Dictated Date/Time:  12/1/2017 7:14 PM





CHEST ONE VIEW PORTABLE





HISTORY: Atypical  CHEST PAIN





COMPARISON: Chest 11/8/2017.





FINDINGS: Left-sided single lead pacemaker. The heart remains borderline


enlarged. The right lung is clear. Linear retrocardiac density persists and


favors subsegmental atelectasis or scarring. No new focal lung consolidations.


No evidence for pulmonary edema.





IMPRESSION:


No significant change compared to the prior study. No acute process.











Electronically signed by:  Felix Severino M.D.


12/1/2017 6:33 PM





Dictated Date/Time:  12/1/2017 6:32 PM





Laboratory Results


12/1/17 18:47








Red Blood Count 3.83, Mean Corpuscular Volume 94.8, Mean Corpuscular Hemoglobin 

30.8, Mean Corpuscular Hemoglobin Concent 32.5, Mean Platelet Volume 10.5, 

Neutrophils (%) (Auto) 79.3, Lymphocytes (%) (Auto) 10.4, Monocytes (%) (Auto) 

7.7, Eosinophils (%) (Auto) 2.0, Basophils (%) (Auto) 0.3, Neutrophils # (Auto) 

9.30, Lymphocytes # (Auto) 1.22, Monocytes # (Auto) 0.90, Eosinophils # (Auto) 

0.23, Basophils # (Auto) 0.03





12/1/17 18:47

















Test


  12/1/17


17:50 12/1/17


18:47


 


White Blood Count


  


  11.72 K/uL


(4.8-10.8)


 


Red Blood Count


  


  3.83 M/uL


(4.7-6.1)


 


Hemoglobin


  


  11.8 g/dL


(14.0-18.0)


 


Hematocrit  36.3 % (42-52) 


 


Mean Corpuscular Volume


  


  94.8 fL


()


 


Mean Corpuscular Hemoglobin


  


  30.8 pg


(25-34)


 


Mean Corpuscular Hemoglobin


Concent 


  32.5 g/dl


(32-36)


 


Platelet Count


  


  268 K/uL


(130-400)


 


Mean Platelet Volume


  


  10.5 fL


(7.4-10.4)


 


Neutrophils (%) (Auto)  79.3 % 


 


Lymphocytes (%) (Auto)  10.4 % 


 


Monocytes (%) (Auto)  7.7 % 


 


Eosinophils (%) (Auto)  2.0 % 


 


Basophils (%) (Auto)  0.3 % 


 


Neutrophils # (Auto)


  


  9.30 K/uL


(1.4-6.5)


 


Lymphocytes # (Auto)


  


  1.22 K/uL


(1.2-3.4)


 


Monocytes # (Auto)


  


  0.90 K/uL


(0.11-0.59)


 


Eosinophils # (Auto)


  


  0.23 K/uL


(0-0.5)


 


Basophils # (Auto)


  


  0.03 K/uL


(0-0.2)


 


RDW Standard Deviation


  


  44.7 fL


(36.4-46.3)


 


RDW Coefficient of Variation


  


  12.9 %


(11.5-14.5)


 


Immature Granulocyte % (Auto)  0.3 % 


 


Immature Granulocyte # (Auto)


  


  0.04 K/uL


(0.00-0.02)


 


Venous Blood pH


  


  7.37


(7.36-7.41)


 


Venous Blood Partial Pressure


CO2 


  49 mmHg


(38.0-50.0)


 


Venous Blood Partial Pressure


O2 


  22 mmHg 


 


 


Venous Blood HCO3  28 mmol/L 


 


Venous Blood Oxygen Saturation  < 60.0 % 


 


Venous Blood Base Excess  1.8 mEq/L 


 


Anion Gap


  


  7.0 mmol/L


(3-11)


 


Est Creatinine Clear Calc


Drug Dose 


  54.8 ml/min 


 


 


Estimated GFR (


American) 


  71.4 


 


 


Estimated GFR (Non-


American 


  61.6 


 


 


BUN/Creatinine Ratio  17.1 (10-20) 


 


Lactic Acid Level


  


  1.2 mmol/L


(0.4-2.0)


 


Calcium Level


  


  8.3 mg/dl


(8.5-10.1)


 


Total Bilirubin


  


  0.4 mg/dl


(0.2-1)


 


Direct Bilirubin


  


  0.1 mg/dl


(0-0.2)


 


Aspartate Amino Transf


(AST/SGOT) 


  13 U/L (15-37) 


 


 


Alanine Aminotransferase


(ALT/SGPT) 


  16 U/L (12-78) 


 


 


Alkaline Phosphatase


  


  72 U/L


()


 


Troponin I


  


  < 0.015 ng/ml


(0-0.045)


 


Pro-B-Type Natriuretic Peptide


  


  8543 pg/ml


(0-1800)


 


Total Protein


  


  6.7 gm/dl


(6.4-8.2)


 


Albumin


  


  2.7 gm/dl


(3.4-5.0)


 


Lipase


  


  269 U/L


()





Laboratory results reviewed by me





Medications Administered











 Medications


  (Trade)  Dose


 Ordered  Sig/Yobani


 Route  Start Time


 Stop Time Status Last Admin


Dose Admin


 


 Sodium Chloride  250 ml @ 


 999 mls/hr  Q16M STAT


 IV  12/1/17 17:50


 12/1/17 18:05 DC 12/1/17 17:50


999 MLS/HR


 


 Sodium Chloride  250 ml @ 


 999 mls/hr  Q16M STAT


 IV  12/1/17 18:29


 12/1/17 18:44 DC 12/1/17 18:29


999 MLS/HR


 


 Sodium Chloride  500 ml @ 


 999 mls/hr  Q31M STAT


 IV  12/1/17 18:29


 12/1/17 18:59 DC 12/1/17 18:38


999 MLS/HR











ECG


Indication:  syncope


Rate (beats per minute):  65


Rhythm:  other (ventricular paced)


Findings:  no acute ischemic change, other (normal axis)





ED Course


1747: The patient was evaluated in room B03B. A complete history and physical 

exam was performed.





1819: I reevaluated the patient. He is feeling better. I updated him of his 

current exam findings.





2015: Upon reexamination, the patient was resting and feeling better. I 

discussed the test results and treatment plan with him. The patient will be 

evaluated for further management.





2028: I discussed the patient's case with Dr. Rey, [facility]. 

The patient will be evaluated for further management and care.





Medical Decision


I reviewed the patient's past medical history, medications, and the nursing 

notes as described above. Differential diagnoses include: pneumonia, bronchitis

, CHF, ACS, PE, dehydration, UTI, sepsis, electrolyte abnormalities.





The patient is an 85-year-old gentleman presents emergency Department with 

generalized fatigue, dizziness, lightheadedness with a fall today per history 

of present illness.  Arrival the patient is fatigued appearing, no acute 

distress, afebrile. hypotensive with systolic to the 80s.  Patient has dry 

mucous membranes.  He has scant bilateral lower extremity edema that is normal 

for the patient.  Bedside echo shows 100% variability in the IVC suggesting 

dehydration.  He has a mildly enlarged LV with mildly reduced EF.  Patient was 

given  250 mL boluses x 2 good effect on blood pressure and no change in 

respiratory status.  Thus, a 500 mL bolus was given. WBC 11.7. Trop negative. 

BNP 8500 increased from last month of 6000 however CXR unremarkable and patient'

s exam is most c/w intravascular depletion. Given patients significant 

dehydration resulting in hypotension and syncope will admit the patient for 

further management. Case was d/w Dr. Rey, [facility] admitting resident who will 

admit the patient for further management.





Medication Reconcilliation


Current Medication List:  was personally reviewed by me





Blood Pressure Screening


Patient's blood pressure:  Low blood pressure


Monitored by hospitalist.





Consults


Time Called:  2020


Consulting Physician:  Dr. Rey, [facility]


Returned Call:  2028


I discussed the patient's case with Dr. Rey, [facility]. The 

patient will be evaluated for further management and care.





Impression





 Primary Impression:  


 Syncope


 Additional Impressions:  


 Dehydration


 Hypotension





Critical Care


I have personally spent greater than 35 minutes of critical care time in the 

direct management of this patient.  This includes bedside care, interpretation 

of diagnostic studies, and testing, discussion with consultants, patient, and 

family members, and other required patient management activities.  This 35 

minutes is in excess of all separately billable procedures.





Scribe Attestation


The scribe's documentation has been prepared under my direction and personally 

reviewed by me in its entirety. I confirm that the note above accurately 

reflects all work, treatment, procedures, and medical decision making performed 

by me.





Departure Information


Dispostion


Being Evaluated By Hospitalist





Referrals


Frankie Fallon M.D. (PCP)





Problem Qualifiers

## 2017-12-01 NOTE — HISTORY AND PHYSICAL
History & Physical


Date & Time of Service:


Dec 1, 2017 at 22:37


Chief Complaint:


Chest Pain/Bronchitis


Primary Care Physician:


Frankie Fallon M.D.


History of Present Illness


Source:  patient, family, clinic records, hospital records


This is an 86 yo m with a history of dementia, BPH, pacemaker placement for 

tachybrady, A fibb and HTN that is presenting to us after an episode of 

unresponsiveness this evening. According to the EMS the patient was found 

unresponsive on the couch slumped over. He was aroused with sternal rub and 

brought to the ED for evaluation. On admission patient was alert however 

hypotensive. He received a liter of fluid and blood pressure was fluid 

responsive and patient returned to his baseline cognition. The wife was 

unfortunately not available at the time of the interview and patient was 

pleasantly confused in bed. Unable to obtain a meaningful history. The patient 

did deny any pain. Patient just recently had the pacemaker placed the beginning 

of November, approx 3 weeks prior.





Past Medical/Surgical History


A fib


HTN


pacemaker 


BPH





Family History





Patient reports no known family medical history.





Social History


Smoking Status:  Former Smoker


Smokeless Tobacco Use:  No


Alcohol Use:  none


Drug Use:  none


Marital Status:  


Housing status:  lives with family


Occupational Status:  retired





Immunizations


History of Influenza Vaccine:  Unknown


History of Tetanus Vaccine?:  Unknown


History of Pneumococcal:  Unknown


History of Hepatitis B Vaccine:  Unknown





Multi-Drug Resistant Organisms


History of MDRO:  No





Allergies


Coded Allergies:  


     Penicillins (Verified  Allergy, Intermediate, SWELLING, 11/2/17)





Home Medications


Scheduled


Apixaban (Eliquis), 5 MG PO BID


Aspirin (Aspirin Ec), 81 MG PO DAILY


Atorvastatin (Lipitor), 10 MG PO DAILY


Clonidine Hcl (Catapres), 1 TAB PO TID


Diltiazem Hcl Ext Rel (Tiazac), 360 MG PO DAILY


Donepezil Hydrochloride (Aricept), 10 MG PO HS


Finasteride (Proscar), 5 MG PO DAILY


Furosemide (Lasix), 20 MG PO DAILY


Isosorbide Mononitrate (Isosorbide Mononitrate ER), 30 MG PO QAM


Memantine (Namenda), 10 MG PO BID


Metoprolol Tartrate (Lopressor) (Lopressor), 50 MG PO BID


Terazosin (Hytrin), 5 MG PO HS





Review of Systems


Unable to obtain a meaningful ROS secondary to dementia





Physical Exam


Vital Signs











  Date Time  Temp Pulse Resp B/P (MAP) Pulse Ox O2 Delivery O2 Flow Rate FiO2


 


12/1/17 22:27  78 16 122/64 97   


 


12/1/17 21:44  78 16 122/64 97 Room Air  


 


12/1/17 21:28  72      


 


12/1/17 20:55  74 16 119/70 96 Room Air  


 


12/1/17 19:35  60 16 108/60 96 Room Air  


 


12/1/17 18:51  62 16 105/59 96 Room Air  


 


12/1/17 18:26  61 14 101/59 96 Room Air  


 


12/1/17 18:15  60  83/55    


 


12/1/17 18:13  60 14 79/52 95 Room Air  


 


12/1/17 18:03  60 18 83/50 96 Room Air  


 


12/1/17 17:56      Room Air  


 


12/1/17 17:55     95 Room Air  


 


12/1/17 17:51 36.6 72 12 88/59 95 Room Air  


 


12/1/17 17:45  74      








General Appearance:  no apparent distress


Head:  normocephalic, atraumatic


Eyes:  normal inspection


ENT:  normal ENT inspection, + pertinent finding (dry mucus membranes)


Neck:  no JVD


Respiratory/Chest:  normal breath sounds, no respiratory distress, no accessory 

muscle use


Cardiovascular:  regular rate, rhythm, no murmur, normal peripheral pulses, + 

pertinent finding (regular rate )


Abdomen/GI:  normal bowel sounds, non tender, soft


Back:  no CVA tenderness


Extremities/Musculoskelatal:  no calf tenderness


Neurologic/Psych:  alert, + pertinent finding (oriented to self only )


Skin:  normal color, warm/dry, no rash


Lymphatic:  no adenopathy





Diagnostics


Laboratory Results





Results Past 24 Hours








Test


  12/1/17


17:50 12/1/17


18:47 Range/Units


 


 


White Blood Count  11.72 4.8-10.8  K/uL


 


Red Blood Count  3.83 4.7-6.1  M/uL


 


Hemoglobin  11.8 14.0-18.0  g/dL


 


Hematocrit  36.3 42-52  %


 


Mean Corpuscular Volume  94.8   fL


 


Mean Corpuscular Hemoglobin  30.8 25-34  pg


 


Mean Corpuscular Hemoglobin


Concent 


  32.5


  32-36  g/dl


 


 


Platelet Count  268 130-400  K/uL


 


Mean Platelet Volume  10.5 7.4-10.4  fL


 


Neutrophils (%) (Auto)  79.3  %


 


Lymphocytes (%) (Auto)  10.4  %


 


Monocytes (%) (Auto)  7.7  %


 


Eosinophils (%) (Auto)  2.0  %


 


Basophils (%) (Auto)  0.3  %


 


Neutrophils # (Auto)  9.30 1.4-6.5  K/uL


 


Lymphocytes # (Auto)  1.22 1.2-3.4  K/uL


 


Monocytes # (Auto)  0.90 0.11-0.59  K/uL


 


Eosinophils # (Auto)  0.23 0-0.5  K/uL


 


Basophils # (Auto)  0.03 0-0.2  K/uL


 


RDW Standard Deviation  44.7 36.4-46.3  fL


 


RDW Coefficient of Variation  12.9 11.5-14.5  %


 


Immature Granulocyte % (Auto)  0.3  %


 


Immature Granulocyte # (Auto)  0.04 0.00-0.02  K/uL


 


Venous Blood pH  7.37 7.36-7.41  


 


Venous Blood Partial Pressure


CO2 


  49


  38.0-50.0  mmHg


 


 


Venous Blood Partial Pressure


O2 


  22


   mmHg


 


 


Venous Blood HCO3  28  mmol/L


 


Venous Blood Oxygen Saturation  < 60.0  %


 


Venous Blood Base Excess  1.8  mEq/L


 


Sodium Level  138 136-145  mmol/L


 


Potassium Level  4.1 3.5-5.1  mmol/L


 


Chloride Level  105   mmol/L


 


Carbon Dioxide Level  26 21-32  mmol/L


 


Anion Gap  7.0 3-11  mmol/L


 


Blood Urea Nitrogen  19 7-18  mg/dl


 


Creatinine


  


  1.09


  0.60-1.40


mg/dl


 


Est Creatinine Clear Calc


Drug Dose 


  54.8


   ml/min


 


 


Estimated GFR (


American) 


  71.4


   


 


 


Estimated GFR (Non-


American 


  61.6


   


 


 


BUN/Creatinine Ratio  17.1 10-20  


 


Random Glucose  109 70-99  mg/dl


 


Lactic Acid Level  1.2 0.4-2.0  mmol/L


 


Calcium Level  8.3 8.5-10.1  mg/dl


 


Total Bilirubin  0.4 0.2-1  mg/dl


 


Direct Bilirubin  0.1 0-0.2  mg/dl


 


Aspartate Amino Transf


(AST/SGOT) 


  13


  15-37  U/L


 


 


Alanine Aminotransferase


(ALT/SGPT) 


  16


  12-78  U/L


 


 


Alkaline Phosphatase  72   U/L


 


Troponin I  < 0.015 0-0.045  ng/ml


 


Pro-B-Type Natriuretic Peptide  8543 0-1800  pg/ml


 


Total Protein  6.7 6.4-8.2  gm/dl


 


Albumin  2.7 3.4-5.0  gm/dl


 


Lipase  269   U/L








Microbiology Results


12/1/17 Blood Culture, Received


          Pending


12/1/17 Blood Culture, Received


          Pending





Diagnostic Radiology


CHEST ONE VIEW PORTABLE





HISTORY: Atypical  CHEST PAIN





COMPARISON: Chest 11/8/2017.





FINDINGS: Left-sided single lead pacemaker. The heart remains borderline


enlarged. The right lung is clear. Linear retrocardiac density persists and


favors subsegmental atelectasis or scarring. No new focal lung consolidations.


No evidence for pulmonary edema.





IMPRESSION:


No significant change compared to the prior study. No acute process.





EKG


Ventricular-paced rhythm


Abnormal ECG


When compared with ECG of 07-NOV-2017 16:18,


Vent. rate has decreased BY 3 BPM





Impression


Assessment and Plan


This is an 86 yo m with a history of dementia, pacemaker, htn, bph that is 

suffering from an ALOC most likely secondary to dehydration (BUN elevated and 

dry mucus membranes). Patient will be observed overnight and receive gentle 

rehydration. Patient may need placement so PT/OT has been arranged. 





Hypotension secondary to dehydration/ medication regimen


- resolved and has significantly improved


- will hold the lasix 20 mg 


- will also hold the hytrin 5 mg 


- orthostatics in the am 


- intake/ output and daily weights 


- PT/OT eval





HTN


- Continue the Clonipine 0.1 mg tid starting tomorrow as, concern for rebound 

htn with abrupt d/c 





BPH


- Hytrin was held 


- patient was previously on finasteride and tamsulosin on the previous 

admission however this was not reflective in the medication regimen noted for 

this admission, to be confirmed with the wife 





Afibb/ Pacemaker/ CAD


- continue diltiazem 360 mg and Lopressor 50 gm bid  with hold parameters 





DVT Prophylaxis 


Apixaban cont'd








Attending addendum:








I have physically seen this patient, have supervised the medical residents 

activities, and agree with the H&P unless as otherwise noted.





Assessment and Plan:





Hypotension multifactorial with dehydration and meds.


Has resolved with IV fluids


Hold Lasix 20 mg by mouth daily and Hytrin 5 mg by mouth daily.


Follow with the study vital signs.


Consult PT/OT.





Atrial fibrillation/CAD/pacemaker/hypertension--


Resume indications in a.m. if blood pressure continues to improve.


Continue clonidine 0.1 mg by mouth 3 times a day, Lopressor 50 mg by mouth 

twice a day and Eliquis.


Split diltiazem from entrance 360 mg every morning to 180 mg by mouth twice a 

day with hold parameters.





BPH hold Hytrin as noted above.


Verify usage of finasteride and tamsulosin, which would be a better treatment 

protocol at this time





Level of Care


Telemetry





Advanced Directives


Existing Advance Directive:  No


Existing Living Will:  No


Existing Power of :  No





Resuscitation Status


FULL RESUSCITATION





VTE Prophylaxis


VTE Risk Assessment Done? Y/N:  Yes


Risk Level:  Moderate


Given or contraindicated:  Other Anticoagulation





Social Service Consult


None Apply





Note


Total Time:   Critical Care 30 - 74 minutes





Additional Copies To


Frankie Fallon M.D.

## 2017-12-02 VITALS
TEMPERATURE: 97.52 F | DIASTOLIC BLOOD PRESSURE: 83 MMHG | HEART RATE: 77 BPM | SYSTOLIC BLOOD PRESSURE: 137 MMHG | OXYGEN SATURATION: 97 %

## 2017-12-02 VITALS
SYSTOLIC BLOOD PRESSURE: 125 MMHG | TEMPERATURE: 97.52 F | DIASTOLIC BLOOD PRESSURE: 66 MMHG | OXYGEN SATURATION: 96 % | HEART RATE: 65 BPM

## 2017-12-02 VITALS
TEMPERATURE: 97.34 F | OXYGEN SATURATION: 98 % | DIASTOLIC BLOOD PRESSURE: 70 MMHG | HEART RATE: 65 BPM | SYSTOLIC BLOOD PRESSURE: 131 MMHG

## 2017-12-02 VITALS
DIASTOLIC BLOOD PRESSURE: 69 MMHG | OXYGEN SATURATION: 96 % | TEMPERATURE: 98.06 F | HEART RATE: 80 BPM | SYSTOLIC BLOOD PRESSURE: 135 MMHG

## 2017-12-02 VITALS
OXYGEN SATURATION: 96 % | TEMPERATURE: 97.52 F | SYSTOLIC BLOOD PRESSURE: 125 MMHG | DIASTOLIC BLOOD PRESSURE: 66 MMHG | HEART RATE: 65 BPM

## 2017-12-02 VITALS
HEART RATE: 77 BPM | DIASTOLIC BLOOD PRESSURE: 61 MMHG | TEMPERATURE: 98.78 F | SYSTOLIC BLOOD PRESSURE: 115 MMHG | OXYGEN SATURATION: 95 %

## 2017-12-02 VITALS
OXYGEN SATURATION: 98 % | SYSTOLIC BLOOD PRESSURE: 131 MMHG | TEMPERATURE: 97.34 F | DIASTOLIC BLOOD PRESSURE: 72 MMHG | HEART RATE: 70 BPM

## 2017-12-02 VITALS — OXYGEN SATURATION: 98 %

## 2017-12-02 VITALS
SYSTOLIC BLOOD PRESSURE: 144 MMHG | DIASTOLIC BLOOD PRESSURE: 70 MMHG | OXYGEN SATURATION: 96 % | TEMPERATURE: 97.88 F | HEART RATE: 93 BPM

## 2017-12-02 LAB
EOSINOPHIL NFR BLD AUTO: 265 K/UL (ref 130–400)
HCT VFR BLD CALC: 34.1 % (ref 42–52)
MCH RBC QN AUTO: 31.1 PG (ref 25–34)
MCHC RBC AUTO-ENTMCNC: 32.8 G/DL (ref 32–36)
MCV RBC AUTO: 94.7 FL (ref 80–100)
PMV BLD AUTO: 10.5 FL (ref 7.4–10.4)
RBC # BLD AUTO: 3.6 M/UL (ref 4.7–6.1)
WBC # BLD AUTO: 8.24 K/UL (ref 4.8–10.8)

## 2017-12-02 RX ADMIN — METOPROLOL TARTRATE SCH MG: 50 TABLET, FILM COATED ORAL at 07:45

## 2017-12-02 RX ADMIN — Medication SCH MG: at 07:45

## 2017-12-02 RX ADMIN — DONEPEZIL HYDROCHLORIDE SCH MG: 10 TABLET, FILM COATED ORAL at 07:44

## 2017-12-02 RX ADMIN — SODIUM CHLORIDE SCH MLS/HR: 900 INJECTION, SOLUTION INTRAVENOUS at 12:20

## 2017-12-02 RX ADMIN — FINASTERIDE SCH MG: 5 TABLET, FILM COATED ORAL at 07:45

## 2017-12-02 RX ADMIN — ATORVASTATIN CALCIUM SCH MG: 10 TABLET, FILM COATED ORAL at 07:45

## 2017-12-02 RX ADMIN — DONEPEZIL HYDROCHLORIDE SCH MG: 10 TABLET, FILM COATED ORAL at 19:32

## 2017-12-02 RX ADMIN — DILTIAZEM HYDROCHLORIDE SCH MG: 180 CAPSULE, EXTENDED RELEASE ORAL at 07:43

## 2017-12-02 RX ADMIN — CLONIDINE HYDROCHLORIDE SCH MG: 0.1 TABLET ORAL at 19:33

## 2017-12-02 RX ADMIN — APIXABAN SCH MG: 2.5 TABLET, FILM COATED ORAL at 19:33

## 2017-12-02 RX ADMIN — MEMANTINE HYDROCHLORIDE SCH MG: 10 TABLET ORAL at 19:32

## 2017-12-02 RX ADMIN — ISOSORBIDE MONONITRATE SCH MG: 30 TABLET, EXTENDED RELEASE ORAL at 07:45

## 2017-12-02 RX ADMIN — CLONIDINE HYDROCHLORIDE SCH MG: 0.1 TABLET ORAL at 14:48

## 2017-12-02 RX ADMIN — METOPROLOL TARTRATE SCH MG: 50 TABLET, FILM COATED ORAL at 19:33

## 2017-12-02 RX ADMIN — APIXABAN SCH MG: 2.5 TABLET, FILM COATED ORAL at 07:43

## 2017-12-02 RX ADMIN — MEMANTINE HYDROCHLORIDE SCH MG: 10 TABLET ORAL at 07:45

## 2017-12-02 RX ADMIN — CLONIDINE HYDROCHLORIDE SCH MG: 0.1 TABLET ORAL at 07:44

## 2017-12-02 NOTE — DISCHARGE INSTRUCTIONS
Discharge Instructions


Date of Service


Dec 2, 2017.





Admission


Reason for Admission:  Dehydration, Hypotension





Discharge


Discharge Diagnosis / Problem:  Dehydration





Discharge Goals


Goal(s):  Decrease discomfort





Activity Recommendations


Activity Limitations:  resume your previous activity





.





Current Hospital Diet


Patient's current hospital diet: AHA Diet (Heart Healthy), Low Sodium Diet (2gm 

Na)





Discharge Diet


Recommended Diet:  AHA Diet (Heart Healthy), Low Sodium Diet (2gm Na)





Pending Studies


Studies pending at discharge:  no





Laboratory Results





Lipid Panel








Test


  10/28/17


04:25 Range/Units


 


 


Triglycerides Level 99  0-150  mg/dl


 


Cholesterol Level 125  0-200  mg/dl


 


HDL Cholesterol 30   mg/dl


 


Cholesterol/HDL Ratio 4.2   


 


LDL Cholesterol, Calculated 75   mg/dl











Medical Emergencies








.


Who to Call and When:





Medical Emergencies:  If at any time you feel your situation is an emergency, 

please call 911 immediately.





.





Non-Emergent Contact


Non-Emergency issues call your:  Primary Care Provider





.


.








"Provider Documentation" section prepared by Morales Mercedes.








.





VTE Core Measure


Inpt VTE Proph given/why not?:  Other Anticoagulation

## 2017-12-02 NOTE — FAMILY MEDICINE PROGRESS NOTE
Progress Note


Date of Service


Dec 2, 2017.





Subjective


Pt evaluation today including:  conversation w/ patient, physical exam, chart 

review, lab review, review of inpatient medication list


Pain:  No pain reported


PO Intake:  Tolerating PO intake


Voiding:  no voiding problems


Mr. Barnes reports that he feels fine today. He denies chest pain, shortness of 

breath, n/v, headaches, palpitations or syncope. He appears confused and states 

he thinks he is in [facility] and that the reason for his admission is because 

he was walking the dog and he tripped and fell, whereas per EMS he was found 

unresponsive at home on the couch. He states he is usually very active, and 

walks several miles a day, without any problems with SOB - unsure of how 

accurate this is. His wife was not present for this. As per his daughter-in-law

, he has not had a dog for years, and he has been baseline confused at home, 

occasionally becoming violent. She states they have not been able to manage at 

home, even with help, and she believes he would benefit from a long-term care 

facility.





   Constitutional:  No fever, No chills


   Respiratory:  No cough, No sputum, No wheezing, No shortness of breath


   Cardiovascular:  No chest pain, No palpitations


   Abdomen:  No pain, No nausea, No vomiting, No diarrhea


   All Other Systems:  Reviewed and Negative





Medications





Current Inpatient Medications








 Medications


  (Trade)  Dose


 Ordered  Sig/Yobani


 Route  Start Time


 Stop Time Status Last Admin


Dose Admin


 


 Sodium Chloride  1,000 ml @ 


 75 mls/hr  [id number]


 IV  12/1/17 23:00


 12/31/17 22:59  12/1/17 23:27


75 MLS/HR


 


 Acetaminophen


  (Tylenol Tab)  650 mg  Q4H  PRN


 PO  12/1/17 21:45


 12/31/17 21:44   


 


 


 Ondansetron HCl


  (Zofran Inj)  4 mg  Q6H  PRN


 IV  12/1/17 21:45


 12/31/17 21:44   


 


 


 Nitroglycerin


  (Nitrostat Tab)  0.4 mg  UD  PRN


 SL  12/1/17 21:45


 12/31/17 21:44   


 


 


 Morphine Sulfate


  (MoRPHine


 SULFATE INJ)  2 mg  Q30M  PRN


 IV  12/1/17 21:45


 12/15/17 21:44   


 


 


 Aspirin


  (Ecotrin Tab)  81 mg  DAILY


 PO  12/2/17 09:00


 1/1/18 08:59  12/2/17 07:45


81 MG


 


 Atorvastatin


 Calcium


  (Lipitor Tab)  10 mg  DAILY


 PO  12/2/17 09:00


 1/1/18 08:59  12/2/17 07:45


10 MG


 


 Clonidine HCl


  (Catapres Tab)  0.1 mg  TID


 PO  12/2/17 09:00


 1/1/18 08:59  12/2/17 07:44


0.1 MG


 


 Diltiazem HCl


  (TIAzac CAP)  360 mg  DAILY


 PO  12/2/17 09:00


 1/1/18 08:59  12/2/17 07:43


360 MG


 


 Donepezil HCl


  (Aricept Tab)  10 mg  HS


 PO  12/2/17 21:00


 1/1/18 20:59  12/2/17 07:44


10 MG


 


 Finasteride


  (Proscar Tab)  5 mg  DAILY


 PO  12/2/17 09:00


 1/1/18 08:59  12/2/17 07:45


5 MG


 


 Isosorbide


 Mononitrate


  (Imdur Ext Rel


 Tab)  30 mg  QAM


 PO  12/2/17 09:00


 1/1/18 08:59  12/2/17 07:45


30 MG


 


 Memantine


  (Namenda Tab)  10 mg  BID


 PO  12/2/17 09:00


 1/1/18 08:59  12/2/17 07:45


10 MG


 


 Metoprolol


 Tartrate


  (Lopressor Tab)  50 mg  BID


 PO  12/2/17 09:00


 1/1/18 08:59  12/2/17 07:45


50 MG


 


 Apixaban


  (Eliquis Tab)  5 mg  BID


 PO  12/2/17 09:00


 1/1/18 08:59  12/2/17 07:43


5 MG


 


 Miscellaneous


  (Iv Fluids


 Completed)  1 ea  PRN  PRN


 N/A  12/1/17 23:45


 12/1/18 23:44   


 











Objective


Vital Signs











  Date Time  Temp Pulse Resp B/P (MAP) Pulse Ox O2 Delivery O2 Flow Rate FiO2


 


12/2/17 08:06      Room Air  


 


12/2/17 07:46 36.7 80 18 135/69 (91) 96 Room Air  


 


12/2/17 04:00      Room Air  


 


12/2/17 04:00 36.6 76 22 144/72 (96) 96 Room Air  





  89  137/70 (92)    





  93  146/85 (105)    


 


12/2/17 00:00      Room Air  


 


12/1/17 22:43 36.7 71 20 144/84 98 Room Air  


 


12/1/17 22:27  78 16 122/64 97   


 


12/1/17 21:44  78 16 122/64 97 Room Air  


 


12/1/17 21:28  72      


 


12/1/17 20:55  74 16 119/70 96 Room Air  


 


12/1/17 19:35  60 16 108/60 96 Room Air  


 


12/1/17 18:51  62 16 105/59 96 Room Air  


 


12/1/17 18:26  61 14 101/59 96 Room Air  


 


12/1/17 18:15  60  83/55    


 


12/1/17 18:13  60 14 79/52 95 Room Air  


 


12/1/17 18:03  60 18 83/50 96 Room Air  


 


12/1/17 17:56      Room Air  


 


12/1/17 17:55     95 Room Air  


 


12/1/17 17:51 36.6 72 12 88/59 95 Room Air  


 


12/1/17 17:45  74      











Physical Exam


General Appearance:  WD/WN, no apparent distress


Respiratory/Chest:  chest non-tender, normal breath sounds, no respiratory 

distress, no accessory muscle use, + decreased breath sounds


Cardiovascular:  no edema, no gallop, no JVD, no murmur, + irregularly irregular


Abdomen:  normal bowel sounds, non tender, soft, no organomegaly, no pulsatile 

mass


Extremities:  + pedal edema (1+ leg edema)


Neurologic/Psychiatric:  alert, + pertinent finding (oriented to person, but 

not time or place)





Laboratory Results





Last 24 Hours








Test


  12/1/17


17:50 12/1/17


18:47 12/2/17


11:09


 


White Blood Count  11.72 K/uL  


 


Red Blood Count  3.83 M/uL  


 


Hemoglobin  11.8 g/dL  


 


Hematocrit  36.3 %  


 


Mean Corpuscular Volume  94.8 fL  


 


Mean Corpuscular Hemoglobin  30.8 pg  


 


Mean Corpuscular Hemoglobin


Concent 


  32.5 g/dl 


  


 


 


Platelet Count  268 K/uL  


 


Mean Platelet Volume  10.5 fL  


 


Neutrophils (%) (Auto)  79.3 %  


 


Lymphocytes (%) (Auto)  10.4 %  


 


Monocytes (%) (Auto)  7.7 %  


 


Eosinophils (%) (Auto)  2.0 %  


 


Basophils (%) (Auto)  0.3 %  


 


Neutrophils # (Auto)  9.30 K/uL  


 


Lymphocytes # (Auto)  1.22 K/uL  


 


Monocytes # (Auto)  0.90 K/uL  


 


Eosinophils # (Auto)  0.23 K/uL  


 


Basophils # (Auto)  0.03 K/uL  


 


RDW Standard Deviation  44.7 fL  


 


RDW Coefficient of Variation  12.9 %  


 


Immature Granulocyte % (Auto)  0.3 %  


 


Immature Granulocyte # (Auto)  0.04 K/uL  


 


Venous Blood pH  7.37  


 


Venous Blood Partial Pressure


CO2 


  49 mmHg 


  


 


 


Venous Blood Partial Pressure


O2 


  22 mmHg 


  


 


 


Venous Blood HCO3  28 mmol/L  


 


Venous Blood Oxygen Saturation  < 60.0 %  


 


Venous Blood Base Excess  1.8 mEq/L  


 


Sodium Level  138 mmol/L  


 


Potassium Level  4.1 mmol/L  


 


Chloride Level  105 mmol/L  


 


Carbon Dioxide Level  26 mmol/L  


 


Anion Gap  7.0 mmol/L  


 


Blood Urea Nitrogen  19 mg/dl  


 


Creatinine  1.09 mg/dl  


 


Est Creatinine Clear Calc


Drug Dose 


  54.8 ml/min 


  


 


 


Estimated GFR (


American) 


  71.4 


  


 


 


Estimated GFR (Non-


American 


  61.6 


  


 


 


BUN/Creatinine Ratio  17.1  


 


Random Glucose  109 mg/dl  


 


Lactic Acid Level  1.2 mmol/L  


 


Calcium Level  8.3 mg/dl  


 


Total Bilirubin  0.4 mg/dl  


 


Direct Bilirubin  0.1 mg/dl  


 


Aspartate Amino Transf


(AST/SGOT) 


  13 U/L 


  


 


 


Alanine Aminotransferase


(ALT/SGPT) 


  16 U/L 


  


 


 


Alkaline Phosphatase  72 U/L  


 


Troponin I  < 0.015 ng/ml  


 


Pro-B-Type Natriuretic Peptide  8543 pg/ml  


 


Total Protein  6.7 gm/dl  


 


Albumin  2.7 gm/dl  


 


Lipase  269 U/L  











Assessment and Plan


Mr. Barnes is an 85 year old male with a history of dementia, pacemaker, htn, 

bph that is suffering from an ALOC most likely secondary to dehydration (BUN 

elevated and dry mucus membranes).





Hypotension secondary to dehydration/ medication regimen


- resolved and has significantly improved


- will d/c the lasix 20 mg, given he does not have CHF and he is not fluid 

overloaded


- will also hold the hytrin 5 mg 


- orthostatics normal


- intake/ output and daily weights 


- PT/OT eval


- d/c fluids as oral intake is adequate





(presumed) moderate dementia


-baseline level of functioning sounds like he's probably not safe to be at 

home.  has also proven this with multiple admissions for probably failing-at-

home type reasons recently between [facility] and [facility]


-cyndi would benefit long term from [facility].  short term may need SNF or rehab - 

follow ongoing, PT/OT input


-does not appear to have capacity for decision making at this time





Elevated WCC


- WCC at 11.7, decreased to 8.2, likely due to dehydration causing a falsely 

increased value


- afebrile, not complaining of any symptoms


- Bcx pending





HTN


- Continue the Clonipine 0.1 mg tid starting tomorrow as, concern for rebound 

htn with abrupt d/c 





BPH


- Hytrin was held 


- patient was previously on finasteride and tamsulosin on the previous 

admission however this was not reflective in the medication regimen noted for 

this admission, to be confirmed with the wife 





Afibb/ Pacemaker/ CAD


- continue diltiazem 360 mg and Lopressor 50 gm bid with hold parameters 





DVT Prophylaxis: Apixaban 


Code; Full


Disposition: transferred to med/surg, likely d/c to long term facility


Resident Physician Supervision Note:





I interviewed and examined the patient. Discussed with Dr. Mercedes and agree 

with findings and plan as documented in the note. Any exceptions or 

clarifications are listed here: None





Documented By:  Sudhakar Burk


feeling ok now.  wants to be off of meds.  DIL notes not doing well at home, 

she harbors concern for his well being and describes a pattern that likely fits 

with at least moderate dementia


viatls noted nad breathing unlabored no pallor or icterus





unresponsive - volume depletion improved.  hold lasix entirely - no notable hx 

of CHF and DIL notes only once an admission at [facility] where CHF was even 

mentioned she's not sure if it was a real part of his problems and also went 

home the next day





med/surg, work on refining med list, work on placement





Resident Tracking


Resident Involvement:  Resident Care Provided


Care Provided:  Adult Hospital Medicine

## 2017-12-03 VITALS
SYSTOLIC BLOOD PRESSURE: 188 MMHG | HEART RATE: 102 BPM | OXYGEN SATURATION: 99 % | TEMPERATURE: 97.34 F | DIASTOLIC BLOOD PRESSURE: 94 MMHG

## 2017-12-03 VITALS
OXYGEN SATURATION: 96 % | TEMPERATURE: 97.52 F | SYSTOLIC BLOOD PRESSURE: 160 MMHG | HEART RATE: 66 BPM | DIASTOLIC BLOOD PRESSURE: 80 MMHG

## 2017-12-03 VITALS — OXYGEN SATURATION: 97 % | DIASTOLIC BLOOD PRESSURE: 71 MMHG | HEART RATE: 65 BPM | SYSTOLIC BLOOD PRESSURE: 136 MMHG

## 2017-12-03 VITALS
OXYGEN SATURATION: 95 % | DIASTOLIC BLOOD PRESSURE: 74 MMHG | SYSTOLIC BLOOD PRESSURE: 147 MMHG | HEART RATE: 69 BPM | TEMPERATURE: 97.7 F

## 2017-12-03 VITALS — OXYGEN SATURATION: 95 %

## 2017-12-03 VITALS — OXYGEN SATURATION: 99 %

## 2017-12-03 RX ADMIN — TAMSULOSIN HYDROCHLORIDE SCH MG: 0.4 CAPSULE ORAL at 19:23

## 2017-12-03 RX ADMIN — METOPROLOL TARTRATE SCH MG: 50 TABLET, FILM COATED ORAL at 19:23

## 2017-12-03 RX ADMIN — DILTIAZEM HYDROCHLORIDE SCH MG: 180 CAPSULE, EXTENDED RELEASE ORAL at 07:36

## 2017-12-03 RX ADMIN — CLONIDINE HYDROCHLORIDE SCH MG: 0.1 TABLET ORAL at 19:22

## 2017-12-03 RX ADMIN — APIXABAN SCH MG: 2.5 TABLET, FILM COATED ORAL at 19:22

## 2017-12-03 RX ADMIN — METOPROLOL TARTRATE SCH MG: 50 TABLET, FILM COATED ORAL at 07:34

## 2017-12-03 RX ADMIN — Medication SCH MG: at 07:33

## 2017-12-03 RX ADMIN — ATORVASTATIN CALCIUM SCH MG: 10 TABLET, FILM COATED ORAL at 07:34

## 2017-12-03 RX ADMIN — MEMANTINE HYDROCHLORIDE SCH MG: 10 TABLET ORAL at 07:35

## 2017-12-03 RX ADMIN — FINASTERIDE SCH MG: 5 TABLET, FILM COATED ORAL at 07:36

## 2017-12-03 RX ADMIN — ISOSORBIDE MONONITRATE SCH MG: 30 TABLET, EXTENDED RELEASE ORAL at 07:34

## 2017-12-03 RX ADMIN — MEMANTINE HYDROCHLORIDE SCH MG: 10 TABLET ORAL at 19:23

## 2017-12-03 RX ADMIN — DONEPEZIL HYDROCHLORIDE SCH MG: 10 TABLET, FILM COATED ORAL at 19:23

## 2017-12-03 RX ADMIN — CLONIDINE HYDROCHLORIDE SCH MG: 0.1 TABLET ORAL at 07:32

## 2017-12-03 RX ADMIN — APIXABAN SCH MG: 2.5 TABLET, FILM COATED ORAL at 07:33

## 2017-12-03 NOTE — FAMILY MEDICINE PROGRESS NOTE
Progress Note


Date of Service


Dec 3, 2017.





Subjective


Pt evaluation today including:  conversation w/ patient, physical exam, chart 

review, lab review, review of inpatient medication list


Pain:  No pain reported


PO Intake:  Tolerating PO intake


Voiding:  no voiding problems


Mr. Barnes states he feels well and has no new complaints today. He denies being 

in any pain.





   Constitutional:  No fever, No chills


   Cardiovascular:  No chest pain


   Abdomen:  No pain, No nausea, No vomiting


   All Other Systems:  Reviewed and Negative





Medications





Current Inpatient Medications








 Medications


  (Trade)  Dose


 Ordered  Sig/Yobani


 Route  Start Time


 Stop Time Status Last Admin


Dose Admin


 


 Acetaminophen


  (Tylenol Tab)  650 mg  Q4H  PRN


 PO  12/1/17 21:45


 12/31/17 21:44   


 


 


 Ondansetron HCl


  (Zofran Inj)  4 mg  Q6H  PRN


 IV  12/1/17 21:45


 12/31/17 21:44   


 


 


 Nitroglycerin


  (Nitrostat Tab)  0.4 mg  UD  PRN


 SL  12/1/17 21:45


 12/31/17 21:44   


 


 


 Morphine Sulfate


  (MoRPHine


 SULFATE INJ)  2 mg  Q30M  PRN


 IV  12/1/17 21:45


 12/15/17 21:44   


 


 


 Aspirin


  (Ecotrin Tab)  81 mg  DAILY


 PO  12/2/17 09:00


 1/1/18 08:59  12/3/17 07:33


81 MG


 


 Atorvastatin


 Calcium


  (Lipitor Tab)  10 mg  DAILY


 PO  12/2/17 09:00


 1/1/18 08:59  12/3/17 07:34


10 MG


 


 Clonidine HCl


  (Catapres Tab)  0.1 mg  TID


 PO  12/2/17 09:00


 1/1/18 08:59  12/3/17 07:32


0.1 MG


 


 Diltiazem HCl


  (TIAzac CAP)  360 mg  DAILY


 PO  12/2/17 09:00


 1/1/18 08:59  12/3/17 07:36


360 MG


 


 Donepezil HCl


  (Aricept Tab)  10 mg  HS


 PO  12/2/17 21:00


 1/1/18 20:59  12/2/17 19:32


10 MG


 


 Finasteride


  (Proscar Tab)  5 mg  DAILY


 PO  12/2/17 09:00


 1/1/18 08:59  12/3/17 07:36


5 MG


 


 Isosorbide


 Mononitrate


  (Imdur Ext Rel


 Tab)  30 mg  QAM


 PO  12/2/17 09:00


 1/1/18 08:59  12/3/17 07:34


30 MG


 


 Memantine


  (Namenda Tab)  10 mg  BID


 PO  12/2/17 09:00


 1/1/18 08:59  12/3/17 07:35


10 MG


 


 Metoprolol


 Tartrate


  (Lopressor Tab)  50 mg  BID


 PO  12/2/17 09:00


 1/1/18 08:59  12/3/17 07:34


50 MG


 


 Apixaban


  (Eliquis Tab)  5 mg  BID


 PO  12/2/17 09:00


 1/1/18 08:59  12/3/17 07:33


5 MG


 


 Miscellaneous


  (Iv Fluids


 Completed)  1 ea  PRN  PRN


 N/A  12/1/17 23:45


 12/1/18 23:44   


 











Objective


Vital Signs











  Date Time  Temp Pulse Resp B/P (MAP) Pulse Ox O2 Delivery O2 Flow Rate FiO2


 


12/3/17 07:45     99 Room Air  


 


12/3/17 07:20 36.3 102 18 188/94 (125) 99 Room Air  


 


12/3/17 00:23      Room Air  


 


12/2/17 23:52 36.4 77 19 137/83 (101) 97 Room Air  


 


12/2/17 20:00      Room Air  


 


12/2/17 19:29 36.3 65 18 131/70 (90) 98 Room Air  


 


12/2/17 18:46     98 Room Air  


 


12/2/17 18:21 36.3 70 20 131/72 (91) 98 Room Air  


 


12/2/17 16:08      Room Air  


 


12/2/17 16:00 36.4 65 17  96   


 


12/2/17 15:04 36.4 65 17 125/66 (85) 96 Room Air  


 


12/2/17 12:06      Room Air  


 


12/2/17 11:44 37.1 77 18 115/61 (79) 95   











Physical Exam


General Appearance:  WD/WN, no apparent distress


Respiratory/Chest:  chest non-tender, lungs clear, normal breath sounds, no 

respiratory distress, no accessory muscle use


Cardiovascular:  no gallop, no JVD, no murmur, + irregularly irregular


Abdomen:  normal bowel sounds, non tender, soft, no organomegaly, no pulsatile 

mass


Extremities:  + pedal edema (trace edema bilaterally)





Laboratory Results





Last 24 Hours








Test


  12/2/17


11:22


 


White Blood Count 8.24 K/uL 


 


Red Blood Count 3.60 M/uL 


 


Hemoglobin 11.2 g/dL 


 


Hematocrit 34.1 % 


 


Mean Corpuscular Volume 94.7 fL 


 


Mean Corpuscular Hemoglobin 31.1 pg 


 


Mean Corpuscular Hemoglobin


Concent 32.8 g/dl 


 


 


RDW Standard Deviation 44.5 fL 


 


RDW Coefficient of Variation 12.7 % 


 


Platelet Count 265 K/uL 


 


Mean Platelet Volume 10.5 fL 











Assessment and Plan


Mr. Barnes is an 85 year old male with a history of dementia, pacemaker, htn, 

bph that is suffering from an ALOC most likely secondary to dehydration (BUN 

elevated and dry mucus membranes).





Hypotension secondary to dehydration/ medication regimen


- resolved and has significantly improved


- d/c Lasix 20 mg, given he does not have CHF and he is not fluid overloaded


- orthostatics normal





(Presumed) Moderate Dementia


- discussed w/daughter in law who states he has been getting progressively more 

confused at times and she feels he's not safe to be at home.  


- will need placement in long term care facility,  short term may need SNF or 

rehab - follow ongoing, PT/OT input


- does not appear to have capacity for decision making at this time as he is 

disoriented to place and time





Elevated WCC


- WCC at 11.7, decreased to 8.2, likely due to dehydration causing a falsely 

increased value


- afebrile, not complaining of any symptoms


- Bcx negative, no concern for infection at this time





HTN


- will try tapering down the clonidine from 0.1mg tid to bid for a day or two, 

and then depending on how he tolerates it, can step down to daily and 

eventually stop it altogether 


- continue imdur 30mg daily - although unclear as to why he is on this as 

unable to find record of anginal symptoms/CAD





BPH


- discontinuing the held hytrin


- will continue finasteride and start tamsulosin as this is more selective than 

hytrin and he is already on a number of antihypertensives





Afib/ Pacemaker


- continue diltiazem 360 mg and Lopressor 50 gm bid with hold parameters





DVT Prophylaxis: Apixaban 


Code; Full


Disposition: remains on med/surg, likely d/c to long term facility





Resident Physician Supervision Note:





I interviewed and examined the patient. Discussed with Dr. Mercedes and agree 

with findings and plan as documented in the note. Any exceptions or 

clarifications are listed here: None





Documented By:  Sudhakar Burk


more delirious today.  nursing and family note he's flashing back to memories 

from a long time ago (?[place])


vitals noted, resting in bed, a little restless but overtly nad





delirium-on-dementia


-acute delirium appearing to be encephalopathy brought on initially by 

dehydration, now being perpetuated by hopsital environment.  by family 

description appears he has probably moderate dementia at baseline and does not 

sound to be safe at home anymore.  continue dementia medications at this time - 

PT/OT for acute placement options, then hopefully [facility] as long term level once 

he progresses to be able





BPH - flomax and proscar





HTN/afib


-will slowly reduce meds based on his vitals and follow up - for now reduce 

clonidine (slowly) 


-w dementia/age/frailty and frequent falls, will try to minimize meds as best 

as possible





otherwise as above





Resident Tracking


Resident Involvement:  Resident Care Provided


Care Provided:  Adult Hospital Medicine

## 2017-12-04 VITALS
OXYGEN SATURATION: 97 % | SYSTOLIC BLOOD PRESSURE: 174 MMHG | DIASTOLIC BLOOD PRESSURE: 72 MMHG | TEMPERATURE: 97.34 F | HEART RATE: 102 BPM

## 2017-12-04 VITALS
OXYGEN SATURATION: 95 % | SYSTOLIC BLOOD PRESSURE: 160 MMHG | TEMPERATURE: 97.88 F | DIASTOLIC BLOOD PRESSURE: 76 MMHG | HEART RATE: 85 BPM

## 2017-12-04 VITALS — HEART RATE: 106 BPM | SYSTOLIC BLOOD PRESSURE: 169 MMHG | DIASTOLIC BLOOD PRESSURE: 96 MMHG

## 2017-12-04 VITALS — SYSTOLIC BLOOD PRESSURE: 170 MMHG | DIASTOLIC BLOOD PRESSURE: 74 MMHG | HEART RATE: 62 BPM

## 2017-12-04 VITALS — SYSTOLIC BLOOD PRESSURE: 169 MMHG | DIASTOLIC BLOOD PRESSURE: 100 MMHG | HEART RATE: 96 BPM

## 2017-12-04 VITALS — OXYGEN SATURATION: 95 %

## 2017-12-04 LAB
ANION GAP SERPL CALC-SCNC: 7 MMOL/L (ref 3–11)
BUN SERPL-MCNC: 10 MG/DL (ref 7–18)
BUN/CREAT SERPL: 14.3 (ref 10–20)
CALCIUM SERPL-MCNC: 8.8 MG/DL (ref 8.5–10.1)
CHLORIDE SERPL-SCNC: 106 MMOL/L (ref 98–107)
CO2 SERPL-SCNC: 28 MMOL/L (ref 21–32)
CREAT CL PREDICTED SERPL C-G-VRATE: 84.1 ML/MIN
CREAT SERPL-MCNC: 0.68 MG/DL (ref 0.6–1.4)
EOSINOPHIL NFR BLD AUTO: 256 K/UL (ref 130–400)
GLUCOSE SERPL-MCNC: 97 MG/DL (ref 70–99)
HCT VFR BLD CALC: 35.1 % (ref 42–52)
MCH RBC QN AUTO: 31.6 PG (ref 25–34)
MCHC RBC AUTO-ENTMCNC: 33.9 G/DL (ref 32–36)
MCV RBC AUTO: 93.4 FL (ref 80–100)
PMV BLD AUTO: 9.9 FL (ref 7.4–10.4)
POTASSIUM SERPL-SCNC: 3.5 MMOL/L (ref 3.5–5.1)
RBC # BLD AUTO: 3.76 M/UL (ref 4.7–6.1)
SODIUM SERPL-SCNC: 140 MMOL/L (ref 136–145)
WBC # BLD AUTO: 6.15 K/UL (ref 4.8–10.8)

## 2017-12-04 RX ADMIN — DILTIAZEM HYDROCHLORIDE SCH MG: 180 CAPSULE, EXTENDED RELEASE ORAL at 09:45

## 2017-12-04 RX ADMIN — TAMSULOSIN HYDROCHLORIDE SCH MG: 0.4 CAPSULE ORAL at 21:26

## 2017-12-04 RX ADMIN — CLONIDINE HYDROCHLORIDE SCH MG: 0.1 TABLET ORAL at 09:46

## 2017-12-04 RX ADMIN — ATORVASTATIN CALCIUM SCH MG: 10 TABLET, FILM COATED ORAL at 09:47

## 2017-12-04 RX ADMIN — APIXABAN SCH MG: 2.5 TABLET, FILM COATED ORAL at 21:25

## 2017-12-04 RX ADMIN — NITROGLYCERIN PRN MG: 0.4 TABLET SUBLINGUAL at 07:18

## 2017-12-04 RX ADMIN — NITROGLYCERIN PRN MG: 0.4 TABLET SUBLINGUAL at 07:07

## 2017-12-04 RX ADMIN — ISOSORBIDE MONONITRATE SCH MG: 30 TABLET, EXTENDED RELEASE ORAL at 09:46

## 2017-12-04 RX ADMIN — CLONIDINE HYDROCHLORIDE SCH MG: 0.1 TABLET ORAL at 21:25

## 2017-12-04 RX ADMIN — FINASTERIDE SCH MG: 5 TABLET, FILM COATED ORAL at 09:46

## 2017-12-04 RX ADMIN — MEMANTINE HYDROCHLORIDE SCH MG: 10 TABLET ORAL at 09:45

## 2017-12-04 RX ADMIN — MEMANTINE HYDROCHLORIDE SCH MG: 10 TABLET ORAL at 21:25

## 2017-12-04 RX ADMIN — DONEPEZIL HYDROCHLORIDE SCH MG: 10 TABLET, FILM COATED ORAL at 21:27

## 2017-12-04 RX ADMIN — Medication SCH MG: at 09:46

## 2017-12-04 RX ADMIN — METOPROLOL TARTRATE SCH MG: 50 TABLET, FILM COATED ORAL at 09:45

## 2017-12-04 RX ADMIN — APIXABAN SCH MG: 2.5 TABLET, FILM COATED ORAL at 09:45

## 2017-12-04 RX ADMIN — NITROGLYCERIN PRN MG: 0.4 TABLET SUBLINGUAL at 08:15

## 2017-12-04 RX ADMIN — METOPROLOL TARTRATE SCH MG: 50 TABLET, FILM COATED ORAL at 21:26

## 2017-12-04 NOTE — FAMILY MEDICINE PROGRESS NOTE
Progress Note


Date of Service


Dec 4, 2017.





Subjective


Pt evaluation today including:  conversation w/ patient, physical exam, chart 

review, lab review, review of studies


Actually initially called to room by nurse for patient c/o chest pain and right 

leg "heavy".  Arrived to room around 0715, patient stated the same in clear 

sentences.  Questionable appeared in acute pain, not diaphoretic, but has dry 

cough.  Knew name, , that he was in a hospital.  Unclear of date () and 

location ([place]).  Workup of these symptoms as noted in A/P.  Otherwise 

patient denied any acute concerns.  About 60-90 minutes later on recheck, 

patient insisted (in full sentences and no SOB) on walking to use the bathroom 

to void.  No difficulty with ambulation or c/o regarding the same.  





On re-evaluation on rounds in afternoon, patient did not c/o any chest or leg 

concerns, actually denying any acute concerns.  He remained confused on location

, date, and why he was in the hospital.  He mentioned he was walking [place] the 

[place] with his dog all weekend long (i.e. unlikely to be true).





   Constitutional:  No fever, No chills


   Respiratory:  + cough, No shortness of breath


   Cardiovascular:  + chest pain, No edema


   Abdomen:  No nausea, No vomiting


   Psychiatric:  + problem reported (history dementia)





Medications





Current Inpatient Medications








 Medications


  (Trade)  Dose


 Ordered  Sig/Oybani


 Route  Start Time


 Stop Time Status Last Admin


Dose Admin


 


 Acetaminophen


  (Tylenol Tab)  650 mg  Q4H  PRN


 PO  17 21:45


 17 21:44  12/3/17 12:00


650 MG


 


 Ondansetron HCl


  (Zofran Inj)  4 mg  Q6H  PRN


 IV  17 21:45


 17 21:44   


 


 


 Nitroglycerin


  (Nitrostat Tab)  0.4 mg  UD  PRN


 SL  17 21:45


 17 21:44  17 08:15


0.4 MG


 


 Morphine Sulfate


  (MoRPHine


 SULFATE INJ)  2 mg  Q30M  PRN


 IV  17 21:45


 12/15/17 21:44  17 08:05


2 MG


 


 Aspirin


  (Ecotrin Tab)  81 mg  DAILY


 PO  17 09:00


 18 08:59  12/3/17 07:33


81 MG


 


 Atorvastatin


 Calcium


  (Lipitor Tab)  10 mg  DAILY


 PO  17 09:00


 18 08:59  12/3/17 07:34


10 MG


 


 Diltiazem HCl


  (TIAzac CAP)  360 mg  DAILY


 PO  17 09:00


 18 08:59  12/3/17 07:36


360 MG


 


 Donepezil HCl


  (Aricept Tab)  10 mg  HS


 PO  17 21:00


 18 20:59  12/3/17 19:23


10 MG


 


 Finasteride


  (Proscar Tab)  5 mg  DAILY


 PO  17 09:00


 18 08:59  12/3/17 07:36


5 MG


 


 Isosorbide


 Mononitrate


  (Imdur Ext Rel


 Tab)  30 mg  QAM


 PO  17 09:00


 18 08:59  12/3/17 07:34


30 MG


 


 Memantine


  (Namenda Tab)  10 mg  BID


 PO  17 09:00


 18 08:59  12/3/17 19:23


10 MG


 


 Metoprolol


 Tartrate


  (Lopressor Tab)  50 mg  BID


 PO  17 09:00


 18 08:59  12/3/17 19:23


50 MG


 


 Apixaban


  (Eliquis Tab)  5 mg  BID


 PO  17 09:00


 18 08:59  12/3/17 19:22


5 MG


 


 Miscellaneous


  (Iv Fluids


 Completed)  1 ea  PRN  PRN


 N/A  17 23:45


 18 23:44   


 


 


 Clonidine HCl


  (Catapres Tab)  0.1 mg  BID


 PO  12/3/17 20:00


 18 19:59  12/3/17 19:22


0.1 MG


 


 Tamsulosin HCl


  (Flomax Cap)  0.4 mg  HS


 PO  12/3/17 22:00


 18 21:59  12/3/17 19:23


0.4 MG











Objective


Vital Signs











  Date Time  Temp Pulse Resp B/P (MAP) Pulse Ox O2 Delivery O2 Flow Rate FiO2


 


17 07:32  106  169/96 (120)    


 


17 07:30      Room Air  


 


17 07:19 36.3 102 18 174/72 (106) 97 Room Air  


 


17 00:00      Room Air  


 


12/3/17 22:47 36.4 66 18 160/80 (106) 96 Room Air  


 


12/3/17 16:00     95 Room Air  


 


12/3/17 15:34 36.5 69 18 147/74 (98) 95 Room Air  


 


12/3/17 11:53  65 21 136/71 (92) 97 Room Air  











Physical Exam


General Appearance:  WD/WN, no apparent distress, + mild distress (questionable 

during subj CP episode, later NAD)


Respiratory/Chest:  lungs clear, normal breath sounds, no respiratory distress


Cardiovascular:  no murmur, + irregularly irregular


Extremities:  non-tender, no pedal edema, + pertinent finding (no difficulty 

ambulating to the bathroom)





Laboratory Results


17 08:15








17 08:15

















Test


  17


08:15


 


Red Blood Count


  3.76 M/uL


(4.7-6.1)


 


Mean Corpuscular Volume


  93.4 fL


()


 


Mean Corpuscular Hemoglobin


  31.6 pg


(25-34)


 


Mean Corpuscular Hemoglobin


Concent 33.9 g/dl


(32-36)


 


RDW Standard Deviation


  43.6 fL


(36.4-46.3)


 


RDW Coefficient of Variation


  12.7 %


(11.5-14.5)


 


Mean Platelet Volume


  9.9 fL


(7.4-10.4)


 


Anion Gap


  7.0 mmol/L


(3-11)


 


Est Creatinine Clear Calc


Drug Dose 84.1 ml/min 


 


 


Estimated GFR (


American) 100.9 


 


 


Estimated GFR (Non-


American 87.1 


 


 


BUN/Creatinine Ratio 14.3 (10-20) 


 


Calcium Level


  8.8 mg/dl


(8.5-10.1)


 


Troponin I


  < 0.015 ng/ml


(0-0.045)











Assessment and Plan


Mr. Barnes is an 85 year old male with a history of dementia, pacemaker, htn, 

bph admitted on  for ALOC most likely secondary to dehydration (BUN 

elevated and dry mucus membranes).





Chest/leg symptoms: Morning of Dec 


- Initially stated mid-chest hurt along with RLE "heavy".  Received two NTG 

sublingual, unclear if CP resolved.  Received third NTG, recheck in 30 min, CP 

resolved.  Stat EKG x 2 were afib rate low 100's without acute ST-T wave 

changes.  Stat pCXR clear.  Stat TnI negative.  Speaking and breathing 

comfortably.  Exact source unclear, but does not appear cardiac or pulmonary 

acutely. 


- Considered ordering doppler of leg, but patient insisted on getting up to 

walk to the bathroom, which he did without difficulty.  





Hypotension secondary to dehydration/ medication regimen


- Hypotension has resolved.  Reportedly fluid-responsive in ED on arrival. 


- Per H&P, was on Lasix 20 PO daily, but held here given does not have a 

history of CHF and he is not fluid overloaded. 





(Presumed) Moderate Dementia


- Previously discussed w/daughter-in-law who states he has been getting 

progressively more confused at times and she feels he's not safe to be at home.

  


- Is persistently disoriented to place and time.  Question if patient has 

capacity for decision making at this time. 


- Will likely need placement in long term care facility.  Short term may need 

SNF or rehab.  Pending PT/OT recs (greatly appreciated). 





Elevated WBC


- Admit 11.7.  Trended back to normal.  May have been related to dehydration.  

Remained afebrile as inpatient.


- 01Dec BCx x 2 no growth to date (Dec). 





HTN


- Per H&P, at home on Clonidine 1 tab TID, Diltiazem  daily, Imdur ER 30 

q AM, Lopressor 50 BID. 


---- As inpatient, trial of clonidine BID with thought to stop entirely.  Will 

need to closely monitor as more recent BP have been elevated. 


---- Also question of why patient is on imdur with no known history of angina 

or CAD. 


---- Question of why patient is on lasix 20 mg daily at home as well.  





BPH


- Held home hytrin. 


- Continuing home finasteride 5 daily. 


- Started tamsulosin 0.4 mg q HS (more selective than hytrin and patient is 

already on many anti-HTN meds). 





Afib/ Pacemaker


- Continue home diltiazem 360 mg and Lopressor 50 gm bid with hold parameters





DVT Prophylaxis: Apixaban 5 mg PO BID.  


Code: Full


Disposition: - Working with , who spoke with daughter in law (last 

Dec), for referrals to SNF.  


- PT recs: Pending. 


- OT recs: 03Dec attempted eval, patient confused, says will re-eval later. 





Resident Physician Supervision Note:





I was present with Dr. Stratton during the history and exam. I discussed the 

case with the resident and agree with the findings and plan as documented in 

the note.  Any exceptions or clarifications are listed here: 86 y/o with 

significant dementia admitted with unresponsive episode. He was noted to be 

hypotensive, which improved with fluids. Today, he seems to be back at his 

baseline, at least his baseline as described to me during an admission last 

month.





Recommend


1) Interrogate pacemaker


2) Discuss with case management regarding potential placement.











Documented By:  Coy Dave





Resident Tracking


Resident Involvement:  Resident Care Provided


Care Provided:  Adult Hospital Medicine (inpt rounds)

## 2017-12-04 NOTE — DIAGNOSTIC IMAGING REPORT
CHEST ONE VIEW PORTABLE



CLINICAL HISTORY: Chest pain.    



COMPARISON STUDY: Chest radiograph December 1, 2017.



FINDINGS: A single lead left subclavian pacer remains in place.

Cardiomediastinal silhouette is stable. There is no evidence of pulmonary edema.

No consolidation is identified. There is no pneumothorax or pleural effusion. 



IMPRESSION:  No acute cardiopulmonary findings. 









Electronically signed by:  Skip Lowe M.D.

12/4/2017 9:00 AM



Dictated Date/Time:  12/4/2017 8:59 AM

## 2017-12-05 VITALS
OXYGEN SATURATION: 97 % | TEMPERATURE: 97.88 F | DIASTOLIC BLOOD PRESSURE: 74 MMHG | HEART RATE: 93 BPM | SYSTOLIC BLOOD PRESSURE: 119 MMHG

## 2017-12-05 VITALS
TEMPERATURE: 97.88 F | SYSTOLIC BLOOD PRESSURE: 162 MMHG | OXYGEN SATURATION: 95 % | DIASTOLIC BLOOD PRESSURE: 82 MMHG | HEART RATE: 76 BPM

## 2017-12-05 VITALS
DIASTOLIC BLOOD PRESSURE: 87 MMHG | OXYGEN SATURATION: 97 % | SYSTOLIC BLOOD PRESSURE: 150 MMHG | HEART RATE: 107 BPM | TEMPERATURE: 97.88 F

## 2017-12-05 VITALS — SYSTOLIC BLOOD PRESSURE: 119 MMHG | HEART RATE: 93 BPM | DIASTOLIC BLOOD PRESSURE: 74 MMHG

## 2017-12-05 RX ADMIN — METOPROLOL TARTRATE SCH MG: 50 TABLET, FILM COATED ORAL at 07:58

## 2017-12-05 RX ADMIN — MEMANTINE HYDROCHLORIDE SCH MG: 10 TABLET ORAL at 07:56

## 2017-12-05 RX ADMIN — Medication SCH MG: at 07:55

## 2017-12-05 RX ADMIN — FINASTERIDE SCH MG: 5 TABLET, FILM COATED ORAL at 07:56

## 2017-12-05 RX ADMIN — DILTIAZEM HYDROCHLORIDE SCH MG: 180 CAPSULE, EXTENDED RELEASE ORAL at 07:56

## 2017-12-05 RX ADMIN — APIXABAN SCH MG: 2.5 TABLET, FILM COATED ORAL at 07:56

## 2017-12-05 RX ADMIN — ATORVASTATIN CALCIUM SCH MG: 10 TABLET, FILM COATED ORAL at 07:55

## 2017-12-05 RX ADMIN — CLONIDINE HYDROCHLORIDE SCH MG: 0.1 TABLET ORAL at 07:55

## 2017-12-05 RX ADMIN — ISOSORBIDE MONONITRATE SCH MG: 30 TABLET, EXTENDED RELEASE ORAL at 07:58

## 2017-12-05 NOTE — FAMILY MEDICINE PROGRESS NOTE
Progress Note


Date of Service


Dec 5, 2017.





Medications





Current Inpatient Medications








 Medications


  (Trade)  Dose


 Ordered  Sig/Yobani


 Route  Start Time


 Stop Time Status Last Admin


Dose Admin


 


 Acetaminophen


  (Tylenol Tab)  650 mg  Q4H  PRN


 PO  12/1/17 21:45


 12/31/17 21:44  12/3/17 12:00


650 MG


 


 Ondansetron HCl


  (Zofran Inj)  4 mg  Q6H  PRN


 IV  12/1/17 21:45


 12/31/17 21:44   


 


 


 Nitroglycerin


  (Nitrostat Tab)  0.4 mg  UD  PRN


 SL  12/1/17 21:45


 12/31/17 21:44  12/4/17 08:15


0.4 MG


 


 Morphine Sulfate


  (MoRPHine


 SULFATE INJ)  2 mg  Q30M  PRN


 IV  12/1/17 21:45


 12/15/17 21:44  12/4/17 08:05


2 MG


 


 Aspirin


  (Ecotrin Tab)  81 mg  DAILY


 PO  12/2/17 09:00


 1/1/18 08:59  12/5/17 07:55


81 MG


 


 Atorvastatin


 Calcium


  (Lipitor Tab)  10 mg  DAILY


 PO  12/2/17 09:00


 1/1/18 08:59  12/5/17 07:55


10 MG


 


 Diltiazem HCl


  (TIAzac CAP)  360 mg  DAILY


 PO  12/2/17 09:00


 1/1/18 08:59  12/5/17 07:56


360 MG


 


 Donepezil HCl


  (Aricept Tab)  10 mg  HS


 PO  12/2/17 21:00


 1/1/18 20:59  12/4/17 21:27


10 MG


 


 Finasteride


  (Proscar Tab)  5 mg  DAILY


 PO  12/2/17 09:00


 1/1/18 08:59  12/5/17 07:56


5 MG


 


 Isosorbide


 Mononitrate


  (Imdur Ext Rel


 Tab)  30 mg  QAM


 PO  12/2/17 09:00


 1/1/18 08:59  12/5/17 07:58


30 MG


 


 Memantine


  (Namenda Tab)  10 mg  BID


 PO  12/2/17 09:00


 1/1/18 08:59  12/5/17 07:56


10 MG


 


 Metoprolol


 Tartrate


  (Lopressor Tab)  50 mg  BID


 PO  12/2/17 09:00


 1/1/18 08:59  12/5/17 07:58


50 MG


 


 Apixaban


  (Eliquis Tab)  5 mg  BID


 PO  12/2/17 09:00


 1/1/18 08:59  12/5/17 07:56


5 MG


 


 Miscellaneous


  (Iv Fluids


 Completed)  1 ea  PRN  PRN


 N/A  12/1/17 23:45


 12/1/18 23:44   


 


 


 Clonidine HCl


  (Catapres Tab)  0.1 mg  BID


 PO  12/3/17 20:00


 1/2/18 19:59  12/5/17 07:55


0.1 MG


 


 Tamsulosin HCl


  (Flomax Cap)  0.4 mg  HS


 PO  12/3/17 22:00


 1/2/18 21:59  12/4/17 21:26


0.4 MG











Objective


Vital Signs











  Date Time  Temp Pulse Resp B/P (MAP) Pulse Ox O2 Delivery O2 Flow Rate FiO2


 


12/5/17 08:25 36.6 107 18 150/87 (108) 97 Room Air  


 


12/5/17 00:19 36.6 76 20 162/82 (108) 95 Room Air  


 


12/5/17 00:00      Room Air  


 


12/4/17 21:23  62  170/74 (106)    


 


12/4/17 16:00     95 Room Air  


 


12/4/17 15:32 36.6 85 16 160/76 (104) 95 Room Air  


 


12/4/17 10:47  96  169/100 (123)    











Resident Tracking


Resident Involvement:  Resident Care Provided


Care Provided:  Adult Hospital Medicine (inpt rounds)

## 2017-12-05 NOTE — DISCHARGE SUMMARY
Discharge Summary


Date of Service


Dec 5, 2017.





Discharge Summary


Admission Date:


Dec 2, 2017 at 16:39


Discharge Date:  Dec 2, 2017


Discharge Disposition:  Skilled nursing facility


Principal Diagnosis:  Altered level of consciousness


Problems/Secondary Diagnoses:


- Dehydration


- Hypotension, transient


- Hypertension, chronic


- Atrial fibrillation


Immunizations:  


   Have You Had Influenza Vaccine:  Unknown


   History of Tetanus Vaccine?:  Unknown


   History of Pneumococcal:  Unknown


   History of Hepatitis B Vaccine:  Unknown


Procedures:


[id number]


CT Head without contrast


IMPRESSION: There is no hemorrhage, mass effect, or evidence of acute 

territorial ischemia by CT criteria.





[id number]


Portable CXR


FINDINGS: Left-sided single lead pacemaker. The heart remains borderline 

enlarged. The right lung is clear. Linear retrocardiac density persists and 

favors subsegmental atelectasis or scarring. No new focal lung consolidations.  

No evidence for pulmonary edema.


IMPRESSION: No significant change compared to the prior study. No acute process.





[id number]


Portable CXR 


IMPRESSION:  No acute cardiopulmonary findings.


Consultations:


None.





Medication Reconciliation


Continued Medications:  


Apixaban (Eliquis) 5 Mg Tab


5 MG PO BID, TAB





Aspirin (Aspirin Ec) 81 Mg Tab


81 MG PO DAILY





Atorvastatin (Lipitor) 10 Mg Tab


10 MG PO DAILY, TAB





Clonidine Hcl (Catapres) 0.1 Mg Tab


1 TAB PO TID, TAB





Diltiazem Hcl Ext Rel (Tiazac) 360 Mg Capcr


360 MG PO DAILY, CAP





Donepezil Hydrochloride (Aricept) 10 Mg Tab


10 MG PO HS, TAB





Finasteride (Proscar) 5 Mg Tab


5 MG PO DAILY, TAB





Furosemide (Lasix) 20 Mg Tab


20 MG PO DAILY, TAB





Isosorbide Mononitrate (Isosorbide Mononitrate ER) 30 Mg Tabcr


30 MG PO QAM for 30 Days, #30 MG





Memantine (Namenda) 10 Mg Tab


10 MG PO BID, TAB





Metoprolol Tartrate (Lopressor) (Lopressor) 50 Mg Tab


50 MG PO BID for 30 Days, #60 TAB





Terazosin (Hytrin) 5 Mg Cap


5 MG PO HS, CAP











Discharge Exam


Review of Systems:  


   Constitutional:  No fever, No chills


   Respiratory:  No cough, No shortness of breath


   Cardiovascular:  No chest pain, No edema


   Abdomen:  No nausea, No vomiting


   Neurologic:  + problem reported (history of dementia)


Physical Exam:  


   General Appearance:  no apparent distress (at time of d/c, sitting in chair, 

smiling, in NAD)


   Respiratory/Chest:  lungs clear, normal breath sounds, no respiratory 

distress


   Cardiovascular:  no murmur, + irregularly irregular


   Abdomen / GI:  normal bowel sounds, soft, + tenderness (subj >> objective 

ttp generally), + pertinent finding (on re-examination in afternoon, no ttp 

throughout)


   Extremities:  no pedal edema


   Neurologic/Psychiatric:  + disoriented (knows name, , location ("[facility]

", "[place]"), not year or why here)





Hospital Course


** Per HPI on admission on 2017 ** 


Date & Time of Service: Dec 1, 2017 at 22:37


Chief Complaint: Chest Pain/Bronchitis


Primary Care Physician: Frankie Fallon M.D.


History of Present Illness


Source:  patient, family, clinic records, hospital records


This is an 84 yo m with a history of dementia, BPH, pacemaker placement for 

tachybrady, A fibb and HTN that is presenting to us after an episode of 

unresponsiveness this evening. According to the EMS the patient was found 

unresponsive on the couch slumped over. He was aroused with sternal rub and 

brought to the ED for evaluation. On admission patient was alert however 

hypotensive. He received a liter of fluid and blood pressure was fluid 

responsive and patient returned to his baseline cognition. The wife was 

unfortunately not available at the time of the interview and patient was 

pleasantly confused in bed. Unable to obtain a meaningful history. The patient 

did deny any pain. Patient just recently had the pacemaker placed the beginning 

of November, approx 3 weeks prior.





** Per A/P on 2017** 


This is an 84 yo m with a history of dementia, pacemaker, htn, bph that is 

suffering from an ALOC most likely secondary to dehydration (BUN elevated and 

dry mucus membranes). Patient will be observed overnight and receive gentle 

rehydration. Patient may need placement so PT/OT has been arranged. 





Hypotension secondary to dehydration/ medication regimen


- resolved and has significantly improved


- will hold the lasix 20 mg 


- will also hold the hytrin 5 mg 


- orthostatics in the am 


- intake/ output and daily weights 


- PT/OT eval





HTN


- Continue the Clonipine 0.1 mg tid starting tomorrow as, concern for rebound 

htn with abrupt d/c 





BPH


- Hytrin was held 


- patient was previously on finasteride and tamsulosin on the previous 

admission however this was not reflective in the medication regimen noted for 

this admission, to be confirmed with the wife 





Afibb/ Pacemaker/ CAD


- continue diltiazem 360 mg and Lopressor 50 gm bid  with hold parameters 





DVT Prophylaxis 


Apixaban cont'd





-----------





*** Per note on day of discharge, [id number] ***





Mr. Barnes is an 85 year old male with a history of dementia, pacemaker, htn, 

bph admitted on  for ALOC most likely secondary to dehydration (BUN 

elevated and dry mucus membranes).





Hypotension secondary to dehydration/ medication regimen


- Hypotension has resolved.  Reportedly fluid-responsive in ED on arrival. 


- Per H&P, was on Lasix 20 PO daily, but held here given does not have a 

history of CHF and he is not fluid overloaded. 


- Some consideration that the unresponsive episode could be pacemaker-related.  

Ordered interrogation for , only finding was a five-beat run of Vtach, 

otherwise no acute findings. 





Chest/leg symptoms: Morning of c 


- Initially stated mid-chest hurt along with RLE "heavy".  Received two NTG 

sublingual, unclear if CP resolved.  Received third NTG, recheck in 30 min, CP 

resolved.  Stat EKG x 2 were afib rate low 100's without acute ST-T wave 

changes.  Stat pCXR clear.  Stat TnI negative.  Speaking and breathing 

comfortably.  Exact source unclear, but does not appear cardiac or pulmonary 

acutely. 


- Considered ordering doppler of leg, but patient insisted on getting up to 

walk to the bathroom, which he did without difficulty.  


- On re-evaluation over next 24 hours, no further reports of patient chest pain 

or related concerns.  





(Presumed) Moderate Dementia


- Previously discussed w/daughter-in-law who states he has been getting 

progressively more confused at times and she feels he's not safe to be at home.

  


- Is persistently disoriented to time +/- place.  Question if patient has 

capacity for decision making at this time. 


- Will likely need placement in long term care facility.   worked 

with daughter-in-law on placement, see discussion below.  





Elevated WBC


- Admit 11.7.  Trended back to normal.  May have been related to dehydration.  

Remained afebrile as inpatient.


-  BCx x 2 no growth to date (). 





HTN


- Per H&P, at home on Clonidine 1 tab TID, Diltiazem  daily, Imdur ER 30 

q AM, Lopressor 50 BID. 


- As inpatient, trial of clonidine BID with thought to stop entirely.  However, 

blood pressures remained elevated, so will need close PCM follow up for 

management.  D/c on his home TID dose. 


---- Also question of why patient is on imdur with no known history of angina 

or CAD. 


---- Question of why patient is on lasix 20 mg daily at home as well.  





BPH


- Held home hytrin as inpatient.  


- Continuing home finasteride 5 daily. 


- Started tamsulosin 0.4 mg q HS (more selective than hytrin and patient is 

already on multiple anti-hypertensive meds). 





Afib/ Pacemaker


- Continue home diltiazem 360 mg and Lopressor 50 gm bid (with hold parameters 

as inpatient). 





Inpatient DVT Prophylaxis: Apixaban 5 mg PO BID.  


Code: Full


Disposition: - Working with , who spoke with daughter in law (last 

), for referrals to SNF.  [facility] states they can accept the patient.  


- PT recs: Pending. 


- OT recs:  attempted eval, patient confused, says will re-eval later. 





Resident Physician Supervision Note:


I was present with the resident physician during the history and exam. I 

discussed the case with the resident and agree with the findings and plan as 

documented in the note.  Upon examination today, the patient is out of bed 

seated in the chair.  He looks improved compared to yesterday and that is more 

alert.  He has no complaints.  Also present in the room or his wife and son.  

Arrangements have been made for placement at [facility], with family 

providing transport today.





Documented By:  Coy Dave


Total Time Spent:  Less than 30 minutes


This includes examination of the patient, discharge planning, medication 

reconciliation, and communication with other providers.





Discharge Instructions


Please refer to the electronic Patient Visit Report (Discharge Instructions) 

for additional information.





Follow-Up


- Primary care manager


- Cardiology per prior s/p pacemaker placement discussion in November





Additional Copies To


Frankie Fallon M.D.

## 2017-12-05 NOTE — DISCHARGE INSTRUCTIONS
Discharge Instructions


Date of Service


Dec 5, 2017.





Admission


Reason for Admission:  Dehydration, Hypotension





Discharge


Discharge Diagnosis / Problem:  Acutely altered level of consciousness, resolved





Discharge Goals


Goal(s):  Improve function





Activity Recommendations


Activity Limitations:  per Instructions/Follow-up section





.





Instructions / Follow-Up


Instructions / Follow-Up


You are being discharged from the hospital to [facility] for further care.  





It is very important you continue to have regular appointments with your 

primary care doctor to manage, particularly to manage your blood pressure.  





Please continue to follow up with your cardiologist as previously arranged (

from this past November when your pacemaker was placed) for ongoing care with 

the pacemaker and your heart.  





We have discussed your care with your daughter-in-law who will continue to 

assist you with your overall medical care as well.  





Please ask any of your doctors, nurses, healthcare staff, or family members if 

you have any new concerns about your health.





Current Hospital Diet


Patient's current hospital diet: AHA Diet (Heart Healthy), Low Sodium Diet (2gm 

Na)





Discharge Diet


Recommended Diet:  AHA Diet (Heart Healthy), Low Sodium Diet (2gm Na)





Pending Studies


Studies pending at discharge:  yes


List of pending studies:  


Final results of blood cultures (as of Dec 5th, no growth to date).





Laboratory Results





Lipid Panel








Test


  10/28/17


04:25 Range/Units


 


 


Triglycerides Level 99  0-150  mg/dl


 


Cholesterol Level 125  0-200  mg/dl


 


HDL Cholesterol 30   mg/dl


 


Cholesterol/HDL Ratio 4.2   


 


LDL Cholesterol, Calculated 75   mg/dl











Medical Emergencies








.


Who to Call and When:





Medical Emergencies:  If at any time you feel your situation is an emergency, 

please call 911 immediately.





.





Non-Emergent Contact


Non-Emergency issues call your:  Primary Care Provider, Cardiologist





.


.








"Provider Documentation" section prepared by Manjeet Stratton.








.





VTE Core Measure


Inpt VTE Proph given/why not?:  Other Anticoagulation

## 2017-12-07 ENCOUNTER — HOSPITAL ENCOUNTER (OUTPATIENT)
Dept: HOSPITAL 45 - C.LABCC | Age: 82
Discharge: NURSING, REHAB OR LONG-TERM CARE | End: 2017-12-07
Attending: INTERNAL MEDICINE
Payer: COMMERCIAL

## 2017-12-07 DIAGNOSIS — Z95.0: Primary | ICD-10-CM

## 2017-12-07 LAB — TSH SERPL-ACNC: 2.69 UIU/ML (ref 0.3–4.5)

## 2017-12-12 ENCOUNTER — HOSPITAL ENCOUNTER (OUTPATIENT)
Dept: HOSPITAL 45 - C.LABCC | Age: 82
Discharge: NURSING, REHAB OR LONG-TERM CARE | End: 2017-12-12
Attending: INTERNAL MEDICINE
Payer: COMMERCIAL

## 2017-12-12 ENCOUNTER — HOSPITAL ENCOUNTER (EMERGENCY)
Dept: HOSPITAL 45 - C.EDA | Age: 82
Discharge: NURSING, REHAB OR LONG-TERM CARE | End: 2017-12-12
Payer: COMMERCIAL

## 2017-12-12 VITALS
HEIGHT: 65.98 IN | WEIGHT: 206.35 LBS | HEIGHT: 65.98 IN | BODY MASS INDEX: 33.16 KG/M2 | BODY MASS INDEX: 33.16 KG/M2 | WEIGHT: 206.35 LBS

## 2017-12-12 VITALS — HEART RATE: 64 BPM | SYSTOLIC BLOOD PRESSURE: 142 MMHG | OXYGEN SATURATION: 97 % | DIASTOLIC BLOOD PRESSURE: 89 MMHG

## 2017-12-12 VITALS — OXYGEN SATURATION: 97 %

## 2017-12-12 DIAGNOSIS — Z87.891: ICD-10-CM

## 2017-12-12 DIAGNOSIS — R10.9: Primary | ICD-10-CM

## 2017-12-12 DIAGNOSIS — Z95.0: ICD-10-CM

## 2017-12-12 DIAGNOSIS — R07.89: ICD-10-CM

## 2017-12-12 DIAGNOSIS — Z79.82: ICD-10-CM

## 2017-12-12 DIAGNOSIS — R10.9: ICD-10-CM

## 2017-12-12 DIAGNOSIS — F03.90: Primary | ICD-10-CM

## 2017-12-12 LAB
ALBUMIN/GLOB SERPL: 0.8 {RATIO} (ref 0.9–2)
ALP SERPL-CCNC: 69 U/L (ref 45–117)
ALT SERPL-CCNC: 23 U/L (ref 12–78)
ANION GAP SERPL CALC-SCNC: 5 MMOL/L (ref 3–11)
APPEARANCE UR: CLEAR
APPEARANCE UR: CLEAR
AST SERPL-CCNC: 15 U/L (ref 15–37)
BASOPHILS # BLD: 0.03 K/UL (ref 0–0.2)
BASOPHILS NFR BLD: 0.5 %
BILIRUB UR-MCNC: (no result) MG/DL
BILIRUB UR-MCNC: (no result) MG/DL
BUN SERPL-MCNC: 18 MG/DL (ref 7–18)
BUN/CREAT SERPL: 18.5 (ref 10–20)
CALCIUM SERPL-MCNC: 8.3 MG/DL (ref 8.5–10.1)
CHLORIDE SERPL-SCNC: 104 MMOL/L (ref 98–107)
CO2 SERPL-SCNC: 27 MMOL/L (ref 21–32)
COLOR UR: YELLOW
COLOR UR: YELLOW
COMPLETE: YES
CREAT CL PREDICTED SERPL C-G-VRATE: 60.2 ML/MIN
CREAT SERPL-MCNC: 0.96 MG/DL (ref 0.6–1.4)
EOSINOPHIL NFR BLD AUTO: 209 K/UL (ref 130–400)
GLOBULIN SER-MCNC: 3.5 GM/DL (ref 2.5–4)
GLUCOSE SERPL-MCNC: 136 MG/DL (ref 70–99)
HCT VFR BLD CALC: 36.9 % (ref 42–52)
IG%: 0.2 %
IMM GRANULOCYTES NFR BLD AUTO: 27 %
INR PPP: 1.1 (ref 0.9–1.1)
LYMPHOCYTES # BLD: 1.5 K/UL (ref 1.2–3.4)
MANUAL MICROSCOPIC REQUIRED?: NO
MANUAL MICROSCOPIC REQUIRED?: NO
MCH RBC QN AUTO: 30.9 PG (ref 25–34)
MCHC RBC AUTO-ENTMCNC: 32.8 G/DL (ref 32–36)
MCV RBC AUTO: 94.4 FL (ref 80–100)
MONOCYTES NFR BLD: 9.4 %
NEUTROPHILS # BLD AUTO: 5.4 %
NEUTROPHILS NFR BLD AUTO: 57.5 %
NITRITE UR QL STRIP: (no result)
NITRITE UR QL STRIP: (no result)
PARTIAL THROMBOPLASTIN RATIO: 1.2
PH UR STRIP: 5.5 [PH] (ref 4.5–7.5)
PH UR STRIP: 7 [PH] (ref 4.5–7.5)
PMV BLD AUTO: 10.6 FL (ref 7.4–10.4)
POTASSIUM SERPL-SCNC: 4.2 MMOL/L (ref 3.5–5.1)
PROTHROMBIN TIME: 11.8 SECONDS (ref 9–12)
RBC # BLD AUTO: 3.91 M/UL (ref 4.7–6.1)
REVIEW REQ?: NO
REVIEW REQ?: NO
SODIUM SERPL-SCNC: 136 MMOL/L (ref 136–145)
SP GR UR STRIP: 1.02 (ref 1–1.03)
SP GR UR STRIP: > 1.045 (ref 1–1.03)
URINE BILL WITH OR WITHOUT MIC: (no result)
UROBILINOGEN UR-MCNC: (no result) MG/DL
UROBILINOGEN UR-MCNC: (no result) MG/DL
WBC # BLD AUTO: 5.55 K/UL (ref 4.8–10.8)
ZZUR CULT IF INDIC CLEAN CATCH: NO

## 2017-12-12 NOTE — DIAGNOSTIC IMAGING REPORT
CT OF THE HEAD WITHOUT CONTRAST



CLINICAL HISTORY: Altered mental status.    



COMPARISON STUDY:  Head CT December 1, 2017. 



CT DOSE: 537.48 mGy.cm



TECHNIQUE: Helical axial images of the head were obtained without IV contrast.

Automated exposure control was utilized for the study.  A dose lowering

technique was utilized adhering to the principles of ALARA.





FINDINGS: No acute intracranial hemorrhage, midline shift or mass effect is

present. Mild ventricular dilatation is unchanged. Basilar cisterns are patent.

There are no extra-axial collections. Basal ganglia calcification is noted.

There are no findings to suggest acute dural sinus thrombosis or acute

territorial infarct. There is no calvarial fracture. There is trace fluid within

the right mastoid air cells. There is minimal mucosal thickening of the ethmoid

sinuses.



IMPRESSION:  



1. No acute intracranial findings.



2. Stable mild ventricular dilatation. This is likely due to atrophy. Normal

pressure hydrocephalus could appear similar although is considered less likely. 







Electronically signed by:  Skip Lowe M.D.

12/12/2017 7:17 PM



Dictated Date/Time:  12/12/2017 7:13 PM

## 2017-12-12 NOTE — EMERGENCY ROOM VISIT NOTE
History


First contact with patient:  18:38


Chief Complaint:  CHEST PAIN


Stated Complaint:  R SIDE CHEST PAIN & UPPER R QUAD PAIN





History of Present Illness


The patient is a 85 year old male who presents to the Emergency Room brought in 

by EMS with complaints of altered mental status, chest pain and abdominal pain. 

He has known dementia and was recently admitted at [facility] from 1st to 5th December for syncope and had a pacemaker implanted. He denies any syncopal 

event. He denies any nausea, vomiting, change in bowel habit or urinary 

symptoms. He is having chest pain which is a mild ache but he cannot give me a 

severity, it is on the right side of his chest, no radiation, no nausea, 

diaphoresis.





Review of Systems


All other systems reviewed and otherwise negative other than the HPI however 

unclear how reliable the patient is as a historian.





Past Medical/Surgical History


Medical Problems:


(1) Dementia


(2) Precordial chest pain


(3) Tachy-oly syndrome








Family History





Patient reports no known family medical history.





Social History


Smoking Status:  Former Smoker


Drug Use:  none


Marital Status:  


Housing Status:  lives with family


Occupation Status:  retired





Current/Historical Medications


Scheduled


Apixaban (Eliquis), 5 MG PO BID


Aspirin (Aspirin Adult Low Dose), 81 MG PO DAILY


Clonidine Hcl (Catapres), 0.1 MG PO TID


Diltiazem Hcl Ext Rel (Tiazac), 360 MG PO DAILY


Donepezil Hydrochloride (Aricept), 10 MG PO HS


Finasteride (Proscar), 5 MG PO DAILY


Furosemide (Lasix), 20 MG PO DAILY


Isosorbide Mononitrate Ext Rel (Imdur Ext Rel), 30 MG PO QAM


Memantine (Namenda), 10 MG PO BID


Metoprolol Tartrate (Lopressor) (Lopressor), 50 MG PO BID


Simvastatin (Zocor), 20 MG PO QPM


Tamsulosin Hcl (Flomax), 0.4 MG PO DAILY





Scheduled PRN


Acetaminophen (Tylenol), 650 MG PO Q6H PRN for Pain or Fever





Physical Exam


Vital Signs











  Date Time  Temp Pulse Resp B/P (MAP) Pulse Ox O2 Delivery O2 Flow Rate FiO2


 


12/12/17 21:22  69 18 152/74 97 Room Air  


 


12/12/17 19:27  60      


 


12/12/17 19:22  66 18 121/58 94 Room Air  


 


12/12/17 18:56     97 Room Air  


 


12/12/17 18:54 36.9 70  114/64 97 Room Air  


 


12/12/17 18:46     97 Room Air  











Physical Exam








   General Appearance:  WD/WN, no apparent distress


   Head:  normocephalic, atraumatic


   Eyes:  normal inspection, PERRL, EOMI


   Neck:  supple, no JVD


   Respiratory/Chest:  lungs clear, normal breath sounds, no respiratory 

distress, no accessory muscle use, + pertinent finding (tender all over his 

chest on light palpation)


   Cardiovascular:  regular rate, rhythm, no murmur, normal peripheral pulses


   Abdomen / GI:  normal bowel sounds, soft, + tenderness (generalized, no 

guarding or rebound)


   Back:  no CVA tenderness


   Extremities:  no calf tenderness, normal capillary refill, no pedal edema


   Neurologic/Psych:  CNs II-XII nml as tested, no motor/sensory deficits, alert

, normal mood/affect, oriented x 3





Medical Decision & Procedures


ER Provider


Diagnostic Interpretation:


CT OF THE HEAD WITHOUT CONTRAST





CLINICAL HISTORY: Altered mental status.    





COMPARISON STUDY:  Head CT December 1, 2017. 





CT DOSE: 537.48 mGy.cm





TECHNIQUE: Helical axial images of the head were obtained without IV contrast.


Automated exposure control was utilized for the study.  A dose lowering


technique was utilized adhering to the principles of ALARA.





FINDINGS: No acute intracranial hemorrhage, midline shift or mass effect is


present. Mild ventricular dilatation is unchanged. Basilar cisterns are patent.


There are no extra-axial collections. Basal ganglia calcification is noted.


There are no findings to suggest acute dural sinus thrombosis or acute


territorial infarct. There is no calvarial fracture. There is trace fluid within


the right mastoid air cells. There is minimal mucosal thickening of the ethmoid


sinuses.





IMPRESSION:  





1. No acute intracranial findings.





2. Stable mild ventricular dilatation. This is likely due to atrophy. Normal


pressure hydrocephalus could appear similar although is considered less likely. 





Electronically signed by:  Skip Lowe M.D.


12/12/2017 7:17 PM





Dictated Date/Time:  12/12/2017 7:13 PM





CHEST ONE VIEW PORTABLE





CLINICAL HISTORY: Chest pain. Altered mental status.





COMPARISON STUDY:  Chest radiograph December 4, 2017.





FINDINGS: Single lead left subclavian pacemaker is unchanged in position.


Cardiomegaly is unchanged and there is no evidence for pulmonary edema. There is


no consolidation. No pneumothorax or pleural effusion is present. Left neck


surgical clips are noted.





IMPRESSION:  No acute cardiopulmonary findings. 





Electronically signed by:  Skip Lowe M.D.


12/12/2017 7:23 PM





Dictated Date/Time:  12/12/2017 7:22 PM





CT OF THE ABDOMEN AND PELVIS WITH CONTRAST





CLINICAL HISTORY: Dementia. Diffuse abdominal pain.    





COMPARISON STUDY:  None.





TECHNIQUE: Following IV administration of 116 mL of Optiray-320, axial images of


the abdomen and pelvis were obtained from the lung bases to the proximal femurs.


Images were reviewed in the axial, sagittal, and coronal planes. IV contrast was


administered without complication.  A dose lowering technique was utilized


adhering to the principles of ALARA.





CT DOSE: 788.47 mGy.cm





FINDINGS: Several renal cysts are noted. There is a 5 mm left renal calculus.


There are no ureteral calculi and there is no hydronephrosis. The liver, spleen,


adrenal glands and pancreas are unremarkable with the exception of a few


calcified granulomas within the spleen. There is no biliary or pancreatic ductal


dilatation. There is no evidence for a bowel obstruction. The appendix is not


visualized but there is no right lower quadrant inflammation no ascites is


present. There is no lymphadenopathy. Prostate is mildly enlarged. There is


extensive atherosclerotic plaque of the abdominal aorta and major branch


vessels. These vessels are suboptimally assessed on this non-CTA exam. There is


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 No


suspicious osseous lesion is present.





IMPRESSION:  





1. No acute process within the abdomen or pelvis.





2. Extensive atherosclerotic plaque of the abdominal aorta and major branch


vessels which is suboptimally assessed on this non-CTA exam. 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





3. 5 mm left renal calculus. No ureteral calculi or hydronephrosis.





Electronically signed by:  Skip Lowe M.D.


12/12/2017 8:40 PM





Dictated Date/Time:  12/12/2017 8:32 PM





Laboratory Results


12/12/17 18:27








Red Blood Count 3.91, Mean Corpuscular Volume 94.4, Mean Corpuscular Hemoglobin 

30.9, Mean Corpuscular Hemoglobin Concent 32.8, Mean Platelet Volume 10.6, 

Neutrophils (%) (Auto) 57.5, Lymphocytes (%) (Auto) 27.0, Monocytes (%) (Auto) 

9.4, Eosinophils (%) (Auto) 5.4, Basophils (%) (Auto) 0.5, Neutrophils # (Auto) 

3.19, Lymphocytes # (Auto) 1.50, Monocytes # (Auto) 0.52, Eosinophils # (Auto) 

0.30, Basophils # (Auto) 0.03





12/12/17 18:27

















Test


  12/12/17


18:27 12/12/17


21:07 12/12/17


21:12


 


White Blood Count


  5.55 K/uL


(4.8-10.8) 


  


 


 


Red Blood Count


  3.91 M/uL


(4.7-6.1) 


  


 


 


Hemoglobin


  12.1 g/dL


(14.0-18.0) 


  


 


 


Hematocrit 36.9 % (42-52)   


 


Mean Corpuscular Volume


  94.4 fL


() 


  


 


 


Mean Corpuscular Hemoglobin


  30.9 pg


(25-34) 


  


 


 


Mean Corpuscular Hemoglobin


Concent 32.8 g/dl


(32-36) 


  


 


 


Platelet Count


  209 K/uL


(130-400) 


  


 


 


Mean Platelet Volume


  10.6 fL


(7.4-10.4) 


  


 


 


Neutrophils (%) (Auto) 57.5 %   


 


Lymphocytes (%) (Auto) 27.0 %   


 


Monocytes (%) (Auto) 9.4 %   


 


Eosinophils (%) (Auto) 5.4 %   


 


Basophils (%) (Auto) 0.5 %   


 


Neutrophils # (Auto)


  3.19 K/uL


(1.4-6.5) 


  


 


 


Lymphocytes # (Auto)


  1.50 K/uL


(1.2-3.4) 


  


 


 


Monocytes # (Auto)


  0.52 K/uL


(0.11-0.59) 


  


 


 


Eosinophils # (Auto)


  0.30 K/uL


(0-0.5) 


  


 


 


Basophils # (Auto)


  0.03 K/uL


(0-0.2) 


  


 


 


RDW Standard Deviation


  45.4 fL


(36.4-46.3) 


  


 


 


RDW Coefficient of Variation


  13.3 %


(11.5-14.5) 


  


 


 


Immature Granulocyte % (Auto) 0.2 %   


 


Immature Granulocyte # (Auto)


  0.01 K/uL


(0.00-0.02) 


  


 


 


Prothrombin Time


  11.8 SECONDS


(9.0-12.0) 


  


 


 


Prothromb Time International


Ratio 1.1 (0.9-1.1) 


  


  


 


 


Activated Partial


Thromboplast Time 29.9 SECONDS


(21.0-31.0) 


  


 


 


Partial Thromboplastin Ratio 1.2   


 


Anion Gap


  5.0 mmol/L


(3-11) 


  


 


 


Est Creatinine Clear Calc


Drug Dose 60.2 ml/min 


  


  


 


 


Estimated GFR (


American) 83.2 


  


  


 


 


Estimated GFR (Non-


American 71.8 


  


  


 


 


BUN/Creatinine Ratio 18.5 (10-20)   


 


Calcium Level


  8.3 mg/dl


(8.5-10.1) 


  


 


 


Total Bilirubin


  0.3 mg/dl


(0.2-1) 


  


 


 


Aspartate Amino Transf


(AST/SGOT) 15 U/L (15-37) 


  


  


 


 


Alanine Aminotransferase


(ALT/SGPT) 23 U/L (12-78) 


  


  


 


 


Alkaline Phosphatase


  69 U/L


() 


  


 


 


Troponin I


  < 0.015 ng/ml


(0-0.045) 


  


 


 


Total Protein


  6.3 gm/dl


(6.4-8.2) 


  


 


 


Albumin


  2.8 gm/dl


(3.4-5.0) 


  


 


 


Globulin


  3.5 gm/dl


(2.5-4.0) 


  


 


 


Albumin/Globulin Ratio 0.8 (0.9-2)   


 


Lipase


  296 U/L


() 


  


 


 


Urine Color  YELLOW  


 


Urine Appearance  CLEAR (CLEAR)  


 


Urine pH  5.5 (4.5-7.5)  


 


Urine Specific Gravity


  


  > 1.045


(1.000-1.030) 


 


 


Urine Protein  NEG (NEG)  


 


Urine Glucose (UA)  NEG (NEG)  


 


Urine Ketones  NEG (NEG)  


 


Urine Occult Blood  NEG (NEG)  


 


Urine Nitrite  NEG (NEG)  


 


Urine Bilirubin  NEG (NEG)  


 


Urine Urobilinogen  NEG (NEG)  


 


Urine Leukocyte Esterase  NEG (NEG)  


 


Urine WBC (Auto)  1-5 /hpf (0-5)  


 


Urine RBC (Auto)  0-4 /hpf (0-4)  


 


Urine Hyaline Casts (Auto)  0 /lpf (0-5)  


 


Urine Epithelial Cells (Auto)


  


  10-20 /lpf


(0-5) 


 


 


Urine Bacteria (Auto)  NEG (NEG)  


 


Lactic Acid Level


  


  


  0.8 mmol/L


(0.4-2.0)











ECG


Indication:  chest pain


Rate (beats per minute):  61


Rhythm:  other (ventricular paced rhythm)


Change:  no significant change (from last ventricular paced rhythm from 1st December 2017)





ED Course


Complete history and physical was performed by myself


The patient was discussed with Dr Patotn who separately performed history and 

examination


The patient was reassessed multiple times and his chest and abdominal pain 

resolved


The patient was discharged in a stable state back to [facility]





Medical Decision


Prior records/ancillary studies reviewed and summarized above.


Nursing notes reviewed.


Additional history obtained from his daughter in law.





The patient's history was concerning for altered mental status.





Differential diagnosis:


Etiologies such as metabolic, infection, hypoglycemia, electrolyte abnormalities

, cardiac sources, intracerebral event, toxicologic, neurologic, appendicitis, 

diverticulitis, PUD, biliary pathology, UTI, pancreatitis, obstruction, 

mesenteric ischemia, aortic pathology, infections, inflammatory bowel disease, 

renal colic, as well as others were entertained.





Physical examination:


As above. Pain on mild palpation of chest and abdomen (generalized).





Diagnostics interpretation by me:


ECG: Ventricular paced (Sgarbossa's criteria not met)


The labs revealed mild anemia but were otherwise unremarkable. Troponin was 

negative.


Given CT findings of superior mesenteric artery stenosis a lactic acid was 

ordered and was negative supporting no mesenteric ischemia causing her pain





Imaging studies:


CT head - negative for acute change


CT abdo/pelvis as above





By the evaluation outlined above no acute emergent etiology was found for his 

altered mental state, chest and abdominal pain. Unlikely cardiac given initial 

troponin negative and chest pain reproducible on examination. His altered 

mental state appears to be back to his baseline as per his family member.





The patient was informed about the findings as listed above. All questions were 

answered. Return instructions were outlined and the patient was discharged in 

stable condition. 





Referral:


The patient will be transported back to [facility] and followed up by the 

physicians there.





Medication Reconcilliation


Current Medication List:  was personally reviewed by me





Blood Pressure Screening


Patient's blood pressure:  Normal blood pressure


Blood pressure disposition:  Did not require urgent referral





Impression





 Primary Impression:  


 Dementia


 Additional Impressions:  


 Chest wall pain


 Abdominal pain





Departure Information


Dispostion


Rehab Inpatient Facility





Condition


GOOD





Referrals


[facility] (PCP)





Patient Instructions


My [facility]





Additional Instructions





Patient was evaluated in the ER for altered mental status, chest pain and 

abdominal pain. No cause was found on CT head, CXR, CT abdo/pelvis, routine labs

, EKG and UA. His CT abdomen/pelvis did show some superior mesenteric artery 

stenosis however his lactic acid was normal and this is unlikely the cause of 

his abdominal pain given his generalized pain everywhere on mild palpation. 

Recommend he is transferred back to [facility] for further nursing care.





If you have an increasing pain, discomfort, fevers, or difficulty swallowing, 

difficulty breathing, chest pain, recurrent vomiting or diarrhea, come back to 

the Emergency Department.





Resident Tracking


Resident Involvement:  Resident Care Provided


Care Provided:  Adult ED





Problem Qualifiers

## 2017-12-12 NOTE — DIAGNOSTIC IMAGING REPORT
CHEST ONE VIEW PORTABLE



CLINICAL HISTORY: Chest pain. Altered mental status.



COMPARISON STUDY:  Chest radiograph December 4, 2017.



FINDINGS: Single lead left subclavian pacemaker is unchanged in position.

Cardiomegaly is unchanged and there is no evidence for pulmonary edema. There is

no consolidation. No pneumothorax or pleural effusion is present. Left neck

surgical clips are noted.



IMPRESSION:  No acute cardiopulmonary findings. 









Electronically signed by:  Skip Lowe M.D.

12/12/2017 7:23 PM



Dictated Date/Time:  12/12/2017 7:22 PM

## 2017-12-12 NOTE — DIAGNOSTIC IMAGING REPORT
CT OF THE ABDOMEN AND PELVIS WITH CONTRAST



CLINICAL HISTORY: Dementia. Diffuse abdominal pain.    



COMPARISON STUDY:  None.



TECHNIQUE: Following IV administration of 116 mL of Optiray-320, axial images of

the abdomen and pelvis were obtained from the lung bases to the proximal femurs.

Images were reviewed in the axial, sagittal, and coronal planes. IV contrast was

administered without complication.  A dose lowering technique was utilized

adhering to the principles of ALARA.





CT DOSE: 788.47 mGy.cm



FINDINGS: Several renal cysts are noted. There is a 5 mm left renal calculus.

There are no ureteral calculi and there is no hydronephrosis. The liver, spleen,

adrenal glands and pancreas are unremarkable with the exception of a few

calcified granulomas within the spleen. There is no biliary or pancreatic ductal

dilatation. There is no evidence for a bowel obstruction. The appendix is not

visualized but there is no right lower quadrant inflammation no ascites is

present. There is no lymphadenopathy. Prostate is mildly enlarged. There is

extensive atherosclerotic plaque of the abdominal aorta and major branch

vessels. These vessels are suboptimally assessed on this non-CTA exam. There is

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 No

suspicious osseous lesion is present.







IMPRESSION:  



1. No acute process within the abdomen or pelvis.



2. Extensive atherosclerotic plaque of the abdominal aorta and major branch

vessels which is suboptimally assessed on this non-CTA exam. 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



3. 5 mm left renal calculus. No ureteral calculi or hydronephrosis.







Electronically signed by:  Skip Lowe M.D.

12/12/2017 8:40 PM



Dictated Date/Time:  12/12/2017 8:32 PM

## 2017-12-12 NOTE — EMERGENCY ROOM VISIT NOTE
ED Visit Note


First contact with patient:  18:42


The patient was seen and examined with Dr. Lobo Martines, Resident Physician.

  I agree with the history, physical and findings.  Please see the note for 

disposition and details.

## 2017-12-17 ENCOUNTER — HOSPITAL ENCOUNTER (INPATIENT)
Dept: HOSPITAL 45 - C.EDB | Age: 82
LOS: 3 days | Discharge: NURSING, REHAB OR LONG-TERM CARE | DRG: 689 | End: 2017-12-20
Attending: HOSPITALIST | Admitting: HOSPITALIST
Payer: COMMERCIAL

## 2017-12-17 VITALS
HEIGHT: 66 IN | BODY MASS INDEX: 31.36 KG/M2 | BODY MASS INDEX: 31.36 KG/M2 | WEIGHT: 195.11 LBS | WEIGHT: 195.11 LBS | HEIGHT: 66 IN

## 2017-12-17 DIAGNOSIS — Z95.0: ICD-10-CM

## 2017-12-17 DIAGNOSIS — R45.6: ICD-10-CM

## 2017-12-17 DIAGNOSIS — R45.851: ICD-10-CM

## 2017-12-17 DIAGNOSIS — Z16.24: ICD-10-CM

## 2017-12-17 DIAGNOSIS — R07.9: ICD-10-CM

## 2017-12-17 DIAGNOSIS — B95.7: ICD-10-CM

## 2017-12-17 DIAGNOSIS — I48.91: ICD-10-CM

## 2017-12-17 DIAGNOSIS — Z79.899: ICD-10-CM

## 2017-12-17 DIAGNOSIS — E78.5: ICD-10-CM

## 2017-12-17 DIAGNOSIS — K21.9: ICD-10-CM

## 2017-12-17 DIAGNOSIS — E66.9: ICD-10-CM

## 2017-12-17 DIAGNOSIS — F03.91: ICD-10-CM

## 2017-12-17 DIAGNOSIS — I10: ICD-10-CM

## 2017-12-17 DIAGNOSIS — N39.0: Primary | ICD-10-CM

## 2017-12-17 DIAGNOSIS — G93.41: ICD-10-CM

## 2017-12-17 DIAGNOSIS — N40.0: ICD-10-CM

## 2017-12-17 DIAGNOSIS — Z79.82: ICD-10-CM

## 2017-12-17 DIAGNOSIS — Z79.01: ICD-10-CM

## 2017-12-17 LAB
ALP SERPL-CCNC: 75 U/L (ref 45–117)
ALT SERPL-CCNC: 22 U/L (ref 12–78)
ANION GAP SERPL CALC-SCNC: 6 MMOL/L (ref 3–11)
APPEARANCE UR: (no result)
AST SERPL-CCNC: 15 U/L (ref 15–37)
BASOPHILS # BLD: 0.02 K/UL (ref 0–0.2)
BASOPHILS NFR BLD: 0.2 %
BILIRUB UR-MCNC: (no result) MG/DL
BUN SERPL-MCNC: 17 MG/DL (ref 7–18)
BUN/CREAT SERPL: 19.5 (ref 10–20)
CALCIUM SERPL-MCNC: 8.4 MG/DL (ref 8.5–10.1)
CHLORIDE SERPL-SCNC: 109 MMOL/L (ref 98–107)
CKMB/CK RATIO: 4.5 (ref 0–3)
CO2 SERPL-SCNC: 28 MMOL/L (ref 21–32)
COLOR UR: (no result)
COMPLETE: YES
CREAT SERPL-MCNC: 0.87 MG/DL (ref 0.6–1.4)
EOSINOPHIL NFR BLD AUTO: 192 K/UL (ref 130–400)
GLUCOSE SERPL-MCNC: 115 MG/DL (ref 70–99)
HCT VFR BLD CALC: 38.4 % (ref 42–52)
IG%: 0.1 %
IMM GRANULOCYTES NFR BLD AUTO: 16.8 %
INR PPP: 1.1 (ref 0.9–1.1)
LYMPHOCYTES # BLD: 1.35 K/UL (ref 1.2–3.4)
MAGNESIUM SERPL-MCNC: 1.9 MG/DL (ref 1.8–2.4)
MANUAL MICROSCOPIC REQUIRED?: NO
MCH RBC QN AUTO: 31.6 PG (ref 25–34)
MCHC RBC AUTO-ENTMCNC: 33.6 G/DL (ref 32–36)
MCV RBC AUTO: 94.1 FL (ref 80–100)
MONOCYTES NFR BLD: 8.3 %
NEUTROPHILS # BLD AUTO: 5.8 %
NEUTROPHILS NFR BLD AUTO: 68.8 %
NITRITE UR QL STRIP: (no result)
PARTIAL THROMBOPLASTIN RATIO: 1
PH UR STRIP: 6.5 [PH] (ref 4.5–7.5)
PMV BLD AUTO: 10.9 FL (ref 7.4–10.4)
POTASSIUM SERPL-SCNC: 3.9 MMOL/L (ref 3.5–5.1)
PROTHROMBIN TIME: 11.4 SECONDS (ref 9–12)
RBC # BLD AUTO: 4.08 M/UL (ref 4.7–6.1)
REVIEW REQ?: NO
SODIUM SERPL-SCNC: 143 MMOL/L (ref 136–145)
SP GR UR STRIP: 1.02 (ref 1–1.03)
TSH SERPL-ACNC: 6.35 UIU/ML (ref 0.3–4.5)
URINE BILL WITH OR WITHOUT MIC: (no result)
URINE EPITHELIAL CELL AUTO: (no result) /LPF (ref 0–5)
UROBILINOGEN UR-MCNC: (no result) MG/DL
WBC # BLD AUTO: 8.05 K/UL (ref 4.8–10.8)

## 2017-12-17 NOTE — DIAGNOSTIC IMAGING REPORT
HEAD WITHOUT CONTRAST (CT)



CLINICAL HISTORY: 85 years-old Male presenting with EVALUATE WEAKNESS. 



TECHNIQUE: Multidetector CT imaging of the head was performed without the use of

intravenous contrast. IV contrast: None. A dose lowering technique was used

consistent with the principles of ALARA (as low as reasonably achievable). 



COMPARISON: 12/12/2017.



CT DOSE (mGy.cm): The estimated cumulative dose is 537.48 mGy.cm.



FINDINGS: 



 topogram: Unremarkable.



Proportional ventricular and sulcal prominence, likely age-related parenchymal

volume loss. Brain parenchyma normal in appearance with preserved gray-white

differentiation. No mass effect or midline shift. No hemorrhage or acute

territorial infarct. No extra-axial fluid collection. Paranasal sinuses and

mastoid air cells clear. Calvarium intact.    



IMPRESSION:

1.  No acute intracranial abnormality. 







Electronically signed by:  Jose Bess M.D.

12/17/2017 10:59 PM



Dictated Date/Time:  12/17/2017 10:57 PM

## 2017-12-17 NOTE — DIAGNOSTIC IMAGING REPORT
ABD/PELVIS NO IV OR ORAL CONT



CLINICAL HISTORY: 85 years-old Male presenting with lower abd pain. 



TECHNIQUE: Multidetector CT of the abdomen and pelvis was performed without the

use of intravenous contrast. IV contrast: None. A dose lowering technique was

used consistent with the principles of ALARA (as low as reasonably achievable). 



COMPARISON: 12/12/2017.



CT DOSE (mGy.cm): The estimated cumulative dose is 681.31 mGy.cm.



FINDINGS:



 topogram: Partially visualized single lead pacer to the right ventricular

apex and cardiomegaly.



Lung bases: Lung bases clear. Partially visualized pacer lead to the right

ventricular apex. Normal heart size. Coronary artery calcification. No

pericardial or pleural effusion. 



Liver: Normal morphology. Normal density.



Biliary: No gross biliary ductal dilatation allowing for noncontrast technique.

Normal gallbladder.



Pancreas: Mild parenchymal atrophy.



Spleen: Few splenic calcifications may indicate prior granulomatous infection.



Adrenal glands: Normal noncontrast appearance.



Kidneys and ureters: Hypodensity in the left kidney indeterminate but likely

cyst. A hypodensity may also be present at the right kidney but is poorly

assessed. No hydronephrosis. Nonobstructing 5 mm calculus in the interpolar

region of the left kidney. Renal vascular calcification also noted. Ureters

normal.



Bladder: Normal.



Pelvic organs: Prostate enlargement likely secondary to benign prostatic

hyperplasia.



Bowel: Normal. No bowel obstruction.



Peritoneal cavity: No free fluid or intraperitoneal gas.



Lymph nodes: No gross lymphadenopathy allowing for noncontrast technique. Few

prominent lymph nodes in the portacaval region, possibly reactive.



Vasculature: Atherosclerosis of the normal caliber abdominal aorta.



Abdominal wall: Small fat-containing umbilical hernia.



Musculoskeletal: Degenerative changes of the spine. Osteopenia.



IMPRESSION:

1.  No acute intra-abdominal pathology.



2.  Nonobstructing 5 mm left renal calculus.



3.  Prostatomegaly.



4.  Osteopenia.











Electronically signed by:  Jose Bess M.D.

12/17/2017 11:04 PM



Dictated Date/Time:  12/17/2017 10:59 PM

## 2017-12-17 NOTE — DIAGNOSTIC IMAGING REPORT
CHEST ONE VIEW PORTABLE



CLINICAL HISTORY: 85 years-old Male presenting with EVALUATE WEAKNESS. 



TECHNIQUE: Portable upright AP view of the chest was obtained.



COMPARISON: 12/12/2017.



FINDINGS:

Left subclavian pacer with lead to the right ventricular apex unchanged.

Atherosclerosis of aortic arch. Cardiac silhouette top normal in size. Pulmonary

vasculature mildly prominent, unchanged. Lungs and pleural spaces clear. Osseous

structures normal. Upper abdomen normal.



IMPRESSION:

1.  No acute cardiopulmonary disease.







Electronically signed by:  Jose Bess M.D.

12/17/2017 10:47 PM



Dictated Date/Time:  12/17/2017 10:46 PM

## 2017-12-18 VITALS
HEART RATE: 85 BPM | DIASTOLIC BLOOD PRESSURE: 83 MMHG | OXYGEN SATURATION: 96 % | SYSTOLIC BLOOD PRESSURE: 159 MMHG | TEMPERATURE: 97.7 F

## 2017-12-18 VITALS
OXYGEN SATURATION: 96 % | HEART RATE: 75 BPM | SYSTOLIC BLOOD PRESSURE: 106 MMHG | DIASTOLIC BLOOD PRESSURE: 76 MMHG | TEMPERATURE: 98.24 F

## 2017-12-18 VITALS
HEART RATE: 64 BPM | SYSTOLIC BLOOD PRESSURE: 122 MMHG | TEMPERATURE: 97.52 F | OXYGEN SATURATION: 97 % | DIASTOLIC BLOOD PRESSURE: 67 MMHG

## 2017-12-18 VITALS — DIASTOLIC BLOOD PRESSURE: 70 MMHG | SYSTOLIC BLOOD PRESSURE: 112 MMHG | HEART RATE: 60 BPM

## 2017-12-18 RX ADMIN — CLONIDINE HYDROCHLORIDE SCH MG: 0.1 TABLET ORAL at 20:42

## 2017-12-18 RX ADMIN — FINASTERIDE SCH MG: 5 TABLET, FILM COATED ORAL at 07:45

## 2017-12-18 RX ADMIN — METOPROLOL TARTRATE SCH MG: 25 TABLET, FILM COATED ORAL at 07:44

## 2017-12-18 RX ADMIN — MEMANTINE HYDROCHLORIDE SCH MG: 10 TABLET ORAL at 20:43

## 2017-12-18 RX ADMIN — ISOSORBIDE MONONITRATE SCH MG: 30 TABLET, EXTENDED RELEASE ORAL at 07:43

## 2017-12-18 RX ADMIN — APIXABAN SCH MG: 2.5 TABLET, FILM COATED ORAL at 20:44

## 2017-12-18 RX ADMIN — CLONIDINE HYDROCHLORIDE SCH MG: 0.1 TABLET ORAL at 07:41

## 2017-12-18 RX ADMIN — ATORVASTATIN CALCIUM SCH MG: 10 TABLET, FILM COATED ORAL at 07:43

## 2017-12-18 RX ADMIN — DONEPEZIL HYDROCHLORIDE SCH MG: 10 TABLET, FILM COATED ORAL at 20:43

## 2017-12-18 RX ADMIN — DILTIAZEM HYDROCHLORIDE SCH MG: 180 CAPSULE, EXTENDED RELEASE ORAL at 07:45

## 2017-12-18 RX ADMIN — CLONIDINE HYDROCHLORIDE SCH MG: 0.1 TABLET ORAL at 13:39

## 2017-12-18 RX ADMIN — APIXABAN SCH MG: 2.5 TABLET, FILM COATED ORAL at 07:43

## 2017-12-18 RX ADMIN — TAMSULOSIN HYDROCHLORIDE SCH MG: 0.4 CAPSULE ORAL at 20:43

## 2017-12-18 RX ADMIN — MEMANTINE HYDROCHLORIDE SCH MG: 10 TABLET ORAL at 07:44

## 2017-12-18 RX ADMIN — METOPROLOL TARTRATE SCH MG: 25 TABLET, FILM COATED ORAL at 20:43

## 2017-12-18 RX ADMIN — Medication SCH MG: at 07:42
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
## 2017-12-18 NOTE — HISTORY AND PHYSICAL
History & Physical


Date & Time of Service:


Dec 18, 2017 at 01:06


Chief Complaint:


Not All There 302


Primary Care Physician:


Stephanie,The


History of Present Illness


Source:  patient, clinic records, hospital records


85M with a PMHx of dementia, Afib, tachy oly syndrome with Pacemaker, BPH, 

HTN p/w AMS who lives in a nursing home.  No friends or family are present in 

the room during the exam.  Patient is resting comfortably in bed.  He states 

that the blankets are giving him chest pain, STAT EKG was ordered and didn't 

show any changes pending official read.  Patient doesn't remember why he is in 

the hospital and is asking if his wife is coming.  Pt states he is in [facility] 

[place] and that the year is 2005.  Aside from the blankets causing non radiating 

chest pain patient has no acute concerns.





Family History





Patient reports no known family medical history.





Social History


Smoking Status:  Unknown if Ever Smoked


Smokeless Tobacco Use:  Unknown


Alcohol Use:  unknown


Drug Use:  none


Marital Status:  


Housing status:  lives with family


Occupational Status:  retired





Immunizations


History of Influenza Vaccine:  Unknown


History of Tetanus Vaccine?:  Unknown


History of Pneumococcal:  Unknown


History of Hepatitis B Vaccine:  Unknown





Multi-Drug Resistant Organisms


History of MDRO:  No





Allergies


Coded Allergies:  


     Penicillins (Verified  Allergy, Intermediate, SWELLING, 12/17/17)





Home Medications


Scheduled


Apixaban (Eliquis), 5 MG PO BID


Aspirin (Aspirin Adult Low Dose), 81 MG PO QAM


Atorvastatin (Lipitor), 10 MG PO DAILY AT 1700


Clonidine Hcl (Catapres), 0.1 MG PO TID


Diltiazem Hcl Ext Rel (Tiazac), 360 MG PO QAM


Donepezil Hydrochloride (Aricept), 10 MG PO HS


Finasteride (Proscar), 5 MG PO QAM


Isosorbide Mononitrate Ext Rel (Imdur Ext Rel), 30 MG PO QAM


Memantine (Namenda), 10 MG PO BID


Metoprolol Tartrate (Lopressor) (Lopressor), 75 MG PO BID


Tamsulosin Hcl (Flomax), 0.4 MG PO HS





Scheduled PRN


Acetaminophen (Tylenol), 650 MG PO Q6H PRN for Pain or Fever





Review of Systems


Constitutional:  No fever, No chills


Respiratory:  No cough, No sputum


Cardiovascular:  + chest pain (EKG was ordered- non radiating, like a punch in 

the chest, went away by the end of the exam)


Abdomen:  No pain, No nausea, No vomiting, No diarrhea, No constipation


Genitourinary - Male:  No dysuria, No urinary frequency, No urinary urgency, No 

urinary hesitancy, No urinary retention





Physical Exam


Vital Signs











  Date Time  Temp Pulse Resp B/P (MAP) Pulse Ox O2 Delivery O2 Flow Rate FiO2


 


12/17/17 22:58  66      


 


12/17/17 22:55  71 18 169/93 95 Room Air  


 


12/17/17 21:49     97 Room Air  


 


12/17/17 21:26 36.5 82 20 136/74 94 Room Air  








General Appearance:  WD/WN, no apparent distress


Head:  normocephalic, atraumatic


Neck:  supple


Respiratory/Chest:  chest non-tender, lungs clear, normal breath sounds, no 

respiratory distress, no accessory muscle use


Cardiovascular:  no edema, no gallop, no JVD, no murmur, normal peripheral 

pulses, + irregularly irregular


Abdomen/GI:  normal bowel sounds, no organomegaly, + pertinent finding (patient 

had moderate suprapubic tenderness ,no rebound, )


Back:  no CVA tenderness


Extremities/Musculoskelatal:  normal inspection, no pedal edema


Neurologic/Psych:  alert, normal mood/affect, + pertinent finding (oriented 

only to person, not time (year, month, day) or place ([place]))





Diagnostics


Laboratory Results





Results Past 24 Hours








Test


  12/17/17


22:20 12/17/17


22:52 Range/Units


 


 


White Blood Count 8.05  4.8-10.8  K/uL


 


Red Blood Count 4.08  4.7-6.1  M/uL


 


Hemoglobin 12.9  14.0-18.0  g/dL


 


Hematocrit 38.4  42-52  %


 


Mean Corpuscular Volume 94.1    fL


 


Mean Corpuscular Hemoglobin 31.6  25-34  pg


 


Mean Corpuscular Hemoglobin


Concent 33.6


  


  32-36  g/dl


 


 


Platelet Count 192  130-400  K/uL


 


Mean Platelet Volume 10.9  7.4-10.4  fL


 


Neutrophils (%) (Auto) 68.8   %


 


Lymphocytes (%) (Auto) 16.8   %


 


Monocytes (%) (Auto) 8.3   %


 


Eosinophils (%) (Auto) 5.8   %


 


Basophils (%) (Auto) 0.2   %


 


Neutrophils # (Auto) 5.53  1.4-6.5  K/uL


 


Lymphocytes # (Auto) 1.35  1.2-3.4  K/uL


 


Monocytes # (Auto) 0.67  0.11-0.59  K/uL


 


Eosinophils # (Auto) 0.47  0-0.5  K/uL


 


Basophils # (Auto) 0.02  0-0.2  K/uL


 


RDW Standard Deviation 46.5  36.4-46.3  fL


 


RDW Coefficient of Variation 13.5  11.5-14.5  %


 


Immature Granulocyte % (Auto) 0.1   %


 


Immature Granulocyte # (Auto) 0.01  0.00-0.02  K/uL


 


Prothrombin Time


  11.4


  


  9.0-12.0


SECONDS


 


Prothromb Time International


Ratio 1.1


  


  0.9-1.1  


 


 


Activated Partial


Thromboplast Time 26.7


  


  21.0-31.0


SECONDS


 


Partial Thromboplastin Ratio 1.0   


 


Sodium Level 143  136-145  mmol/L


 


Potassium Level 3.9  3.5-5.1  mmol/L


 


Chloride Level 109    mmol/L


 


Carbon Dioxide Level 28  21-32  mmol/L


 


Anion Gap 6.0  3-11  mmol/L


 


Blood Urea Nitrogen 17  7-18  mg/dl


 


Creatinine


  0.87


  


  0.60-1.40


mg/dl


 


Estimated GFR (


American) 91.2


  


   


 


 


Estimated GFR (Non-


American 78.7


  


   


 


 


BUN/Creatinine Ratio 19.5  10-20  


 


Random Glucose 115  70-99  mg/dl


 


Calcium Level 8.4  8.5-10.1  mg/dl


 


Magnesium Level 1.9  1.8-2.4  mg/dl


 


Total Bilirubin 0.3  0.2-1  mg/dl


 


Direct Bilirubin < 0.1  0-0.2  mg/dl


 


Aspartate Amino Transf


(AST/SGOT) 15


  


  15-37  U/L


 


 


Alanine Aminotransferase


(ALT/SGPT) 22


  


  12-78  U/L


 


 


Alkaline Phosphatase 75    U/L


 


Total Creatine Kinase 44    U/L


 


Creatine Kinase MB 2.0  0.5-3.6  ng/ml


 


Creatine Kinase MB Ratio 4.5  0-3.0  


 


Troponin I < 0.015  0-0.045  ng/ml


 


Total Protein 6.4  6.4-8.2  gm/dl


 


Albumin 3.0  3.4-5.0  gm/dl


 


Lipase 298    U/L


 


Thyroid Stimulating Hormone


(TSH) 6.350


  


  0.300-4.500


uIu/ml


 


Urine Color  DK YELLOW  


 


Urine Appearance  TURBID CLEAR  


 


Urine pH  6.5 4.5-7.5  


 


Urine Specific Gravity  1.025 1.000-1.030  


 


Urine Protein  TRACE NEG  


 


Urine Glucose (UA)  NEG NEG  


 


Urine Ketones  NEG NEG  


 


Urine Occult Blood  NEG NEG  


 


Urine Nitrite  POS NEG  


 


Urine Bilirubin  NEG NEG  


 


Urine Urobilinogen  NEG NEG  


 


Urine Leukocyte Esterase  SMALL NEG  


 


Urine WBC (Auto)  10-30 0-5  /hpf


 


Urine RBC (Auto)  0-4 0-4  /hpf


 


Urine Hyaline Casts (Auto)  5-10 0-5  /lpf


 


Urine Epithelial Cells (Auto)  0-5 0-5  /lpf


 


Urine Bacteria (Auto)  2+ NEG  








Microbiology Results


12/17/17 Urine Culture, Received


           Pending





Diagnostic Radiology


ABD/PELVIS NO IV OR ORAL CONT





CLINICAL HISTORY: 85 years-old Male presenting with lower abd pain. 





TECHNIQUE: Multidetector CT of the abdomen and pelvis was performed without the


use of intravenous contrast. IV contrast: None. A dose lowering technique was


used consistent with the principles of ALARA (as low as reasonably achievable). 





COMPARISON: 12/12/2017.





CT DOSE (mGy.cm): The estimated cumulative dose is 681.31 mGy.cm.





FINDINGS:





 topogram: Partially visualized single lead pacer to the right ventricular


apex and cardiomegaly.





Lung bases: Lung bases clear. Partially visualized pacer lead to the right


ventricular apex. Normal heart size. Coronary artery calcification. No


pericardial or pleural effusion. 





Liver: Normal morphology. Normal density.





Biliary: No gross biliary ductal dilatation allowing for noncontrast technique.


Normal gallbladder.





Pancreas: Mild parenchymal atrophy.





Spleen: Few splenic calcifications may indicate prior granulomatous infection.





Adrenal glands: Normal noncontrast appearance.





Kidneys and ureters: Hypodensity in the left kidney indeterminate but likely


cyst. A hypodensity may also be present at the right kidney but is poorly


assessed. No hydronephrosis. Nonobstructing 5 mm calculus in the interpolar


region of the left kidney. Renal vascular calcification also noted. Ureters


normal.





Bladder: Normal.





Pelvic organs: Prostate enlargement likely secondary to benign prostatic


hyperplasia.





Bowel: Normal. No bowel obstruction.





Peritoneal cavity: No free fluid or intraperitoneal gas.





Lymph nodes: No gross lymphadenopathy allowing for noncontrast technique. Few


prominent lymph nodes in the portacaval region, possibly reactive.





Vasculature: Atherosclerosis of the normal caliber abdominal aorta.





Abdominal wall: Small fat-containing umbilical hernia.





Musculoskeletal: Degenerative changes of the spine. Osteopenia.





IMPRESSION:


1.  No acute intra-abdominal pathology.





2.  Nonobstructing 5 mm left renal calculus.





3.  Prostatomegaly.





4.  Osteopenia.





******************************************








CHEST ONE VIEW PORTABLE





CLINICAL HISTORY: 85 years-old Male presenting with EVALUATE WEAKNESS. 





TECHNIQUE: Portable upright AP view of the chest was obtained.





COMPARISON: 12/12/2017.





FINDINGS:


Left subclavian pacer with lead to the right ventricular apex unchanged.


Atherosclerosis of aortic arch. Cardiac silhouette top normal in size. Pulmonary


vasculature mildly prominent, unchanged. Lungs and pleural spaces clear. Osseous


structures normal. Upper abdomen normal.





IMPRESSION:


1.  No acute cardiopulmonary disease.





****************************************








HEAD WITHOUT CONTRAST (CT)





CLINICAL HISTORY: 85 years-old Male presenting with EVALUATE WEAKNESS. 





TECHNIQUE: Multidetector CT imaging of the head was performed without the use of


intravenous contrast. IV contrast: None. A dose lowering technique was used


consistent with the principles of ALARA (as low as reasonably achievable). 





COMPARISON: 12/12/2017.





CT DOSE (mGy.cm): The estimated cumulative dose is 537.48 mGy.cm.





FINDINGS: 





 topogram: Unremarkable.





Proportional ventricular and sulcal prominence, likely age-related parenchymal


volume loss. Brain parenchyma normal in appearance with preserved gray-white


differentiation. No mass effect or midline shift. No hemorrhage or acute


territorial infarct. No extra-axial fluid collection. Paranasal sinuses and


mastoid air cells clear. Calvarium intact.    





IMPRESSION:


1.  No acute intracranial abnormality. 





*********************************





Echo from Oct 2017


* 1. Normal LV size.  Mild concentric LVH.


* 2. Normal LV systolic function.  LVEF 65-70%. No regional wall motion 

abnormalities.


* 3. RV mildly dilated.  RV function normal.


* 4. Borderline MV ALKA.  No LVOT obstruction.


* 5. Moderate left, severe right atrial enlargement.


* 6. Normal estimated PA and RA pressures.


* 7. No prior studies for comparison.





Impression


Assessment and Plan


85M with a PMHx of dementia, Afib, tachy oly syndrome with Pacemaker, BPH, 

HTN p/w AMS.   Started on Rocephin IV daily for UTI.





Encephalopathy 2/2 UTI with a baseline of dementia.  


   UA showed LE, WBC and Bacteria.  No prior cultures to compare to.  


   Rocephin started


   Follow up Lactate. 


   Await Urine Cultures and Sensitivities. 





Afib in setting of h/o tachy oly syndrome with Pacemaker Placement


   c/w Metoprolol 50mg PO BID


   c/w Diltiazem 360mg po daily


   c/w Eliquis 5mg PO BID





Dementia


   c/w Namenda 10mg PO BID + Aricept 10mg PO QHS





Elevated TSH


   TSH = 6.35, pt is not on synthroid. 


   Could be sick euthyroid syndrome, elevated in Oct 2017 as well but normal in 

November 2017





HTN


   c/w Clonidine 0.1mg TID


   c/w Lasix 20mg po daily.


   c/w Imdur - no h/o of CHF in the chart as far as I can appreciate.





HLD


   c/w Zocor 20mg PO QPM. 


   c/w ASA daily.





BPH


   c/w Flomax 0.4mg PO daily  + Finasteride





Diet: Heart Healthy





Dispo: Obs, Med Surg, lives in a nursing home





DVT Proph


Eliquis as above.





FULL CODE





Attending addendum:








I have physically seen this patient, have supervised the medical residents 

activities, and agree with the H&P unless as otherwise noted.





Assessment and Plan:





Altered mental state/UTI/sepsis/encephalopathy/baseline dementia--


Ceftriaxone 1 g IV daily


Follow urine culture and sensitivity reports


Continue donepezil and Namenda





Tachybradycardia syndrome/pacemaker placement/hypertension--


Continue metoprolol tartrate, diltiazem, clonidine, isosorbide mononitrate, 

Lasix and Eliquis





Hyperlipidemia--


continue Zocor





BPH --


continue tamsulosin and Proscar








Level of Care


Med/Surg





Advanced Directives


Existing Advance Directive:  No


Existing Living Will:  No


Existing Power of :  No





Resuscitation Status


FULL RESUSCITATION





VTE Prophylaxis


VTE Risk Assessment Done? Y/N:  Yes


Risk Level:  Moderate


Given or contraindicated:  Other Anticoagulation (on Eliquis)





Social Service Consult


Lives in Personal Care ([facility])


Resident Involvement:  Resident Care Provided


Care Provided:  Adult Hospital Medicine
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
## 2017-12-19 VITALS
SYSTOLIC BLOOD PRESSURE: 146 MMHG | DIASTOLIC BLOOD PRESSURE: 80 MMHG | HEART RATE: 78 BPM | OXYGEN SATURATION: 95 % | TEMPERATURE: 98.06 F

## 2017-12-19 VITALS
DIASTOLIC BLOOD PRESSURE: 89 MMHG | SYSTOLIC BLOOD PRESSURE: 184 MMHG | OXYGEN SATURATION: 98 % | TEMPERATURE: 98.06 F | HEART RATE: 85 BPM

## 2017-12-19 VITALS — DIASTOLIC BLOOD PRESSURE: 76 MMHG | SYSTOLIC BLOOD PRESSURE: 141 MMHG | HEART RATE: 81 BPM

## 2017-12-19 VITALS — HEART RATE: 68 BPM | OXYGEN SATURATION: 96 % | DIASTOLIC BLOOD PRESSURE: 68 MMHG | SYSTOLIC BLOOD PRESSURE: 79 MMHG

## 2017-12-19 VITALS
OXYGEN SATURATION: 95 % | TEMPERATURE: 97.7 F | DIASTOLIC BLOOD PRESSURE: 68 MMHG | SYSTOLIC BLOOD PRESSURE: 109 MMHG | HEART RATE: 62 BPM

## 2017-12-19 VITALS — SYSTOLIC BLOOD PRESSURE: 97 MMHG | OXYGEN SATURATION: 95 % | DIASTOLIC BLOOD PRESSURE: 62 MMHG

## 2017-12-19 VITALS — OXYGEN SATURATION: 96 %

## 2017-12-19 LAB
ANION GAP SERPL CALC-SCNC: 6 MMOL/L (ref 3–11)
BASOPHILS # BLD: 0.02 K/UL (ref 0–0.2)
BASOPHILS NFR BLD: 0.3 %
BUN SERPL-MCNC: 17 MG/DL (ref 7–18)
BUN/CREAT SERPL: 18.6 (ref 10–20)
CALCIUM SERPL-MCNC: 8.8 MG/DL (ref 8.5–10.1)
CHLORIDE SERPL-SCNC: 106 MMOL/L (ref 98–107)
CO2 SERPL-SCNC: 29 MMOL/L (ref 21–32)
COMPLETE: YES
CREAT CL PREDICTED SERPL C-G-VRATE: 61.2 ML/MIN
CREAT SERPL-MCNC: 0.92 MG/DL (ref 0.6–1.4)
EOSINOPHIL NFR BLD AUTO: 156 K/UL (ref 130–400)
GLUCOSE SERPL-MCNC: 95 MG/DL (ref 70–99)
HCT VFR BLD CALC: 39.1 % (ref 42–52)
IG%: 0.2 %
IMM GRANULOCYTES NFR BLD AUTO: 26.6 %
LYMPHOCYTES # BLD: 1.6 K/UL (ref 1.2–3.4)
MCH RBC QN AUTO: 31.3 PG (ref 25–34)
MCHC RBC AUTO-ENTMCNC: 33.2 G/DL (ref 32–36)
MCV RBC AUTO: 94 FL (ref 80–100)
MONOCYTES NFR BLD: 10.3 %
NEUTROPHILS # BLD AUTO: 5.5 %
NEUTROPHILS NFR BLD AUTO: 57.1 %
PMV BLD AUTO: 10.8 FL (ref 7.4–10.4)
POTASSIUM SERPL-SCNC: 3.7 MMOL/L (ref 3.5–5.1)
RBC # BLD AUTO: 4.16 M/UL (ref 4.7–6.1)
SODIUM SERPL-SCNC: 141 MMOL/L (ref 136–145)
WBC # BLD AUTO: 6.01 K/UL (ref 4.8–10.8)

## 2017-12-19 RX ADMIN — DONEPEZIL HYDROCHLORIDE SCH MG: 10 TABLET, FILM COATED ORAL at 20:50

## 2017-12-19 RX ADMIN — ISOSORBIDE MONONITRATE SCH MG: 30 TABLET, EXTENDED RELEASE ORAL at 07:41

## 2017-12-19 RX ADMIN — CEFTRIAXONE SODIUM SCH MLS/HR: 1 INJECTION, POWDER, FOR SOLUTION INTRAVENOUS at 00:21

## 2017-12-19 RX ADMIN — APIXABAN SCH MG: 2.5 TABLET, FILM COATED ORAL at 07:40

## 2017-12-19 RX ADMIN — METOPROLOL TARTRATE SCH MG: 25 TABLET, FILM COATED ORAL at 07:41

## 2017-12-19 RX ADMIN — ATORVASTATIN CALCIUM SCH MG: 10 TABLET, FILM COATED ORAL at 07:41

## 2017-12-19 RX ADMIN — TAMSULOSIN HYDROCHLORIDE SCH MG: 0.4 CAPSULE ORAL at 20:49

## 2017-12-19 RX ADMIN — FINASTERIDE SCH MG: 5 TABLET, FILM COATED ORAL at 13:36

## 2017-12-19 RX ADMIN — METOPROLOL TARTRATE SCH MG: 25 TABLET, FILM COATED ORAL at 20:50

## 2017-12-19 RX ADMIN — Medication SCH MG: at 07:40

## 2017-12-19 RX ADMIN — MEMANTINE HYDROCHLORIDE SCH MG: 10 TABLET ORAL at 20:49

## 2017-12-19 RX ADMIN — CEFTRIAXONE SODIUM SCH MLS/HR: 1 INJECTION, POWDER, FOR SOLUTION INTRAVENOUS at 09:35

## 2017-12-19 RX ADMIN — CLONIDINE HYDROCHLORIDE SCH MG: 0.1 TABLET ORAL at 07:40

## 2017-12-19 RX ADMIN — MEMANTINE HYDROCHLORIDE SCH MG: 10 TABLET ORAL at 07:41

## 2017-12-19 RX ADMIN — DILTIAZEM HYDROCHLORIDE SCH MG: 180 CAPSULE, EXTENDED RELEASE ORAL at 07:42

## 2017-12-19 RX ADMIN — CLONIDINE HYDROCHLORIDE SCH MG: 0.1 TABLET ORAL at 20:50

## 2017-12-19 RX ADMIN — CLONIDINE HYDROCHLORIDE SCH MG: 0.1 TABLET ORAL at 13:36

## 2017-12-19 RX ADMIN — APIXABAN SCH MG: 2.5 TABLET, FILM COATED ORAL at 20:49
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
## 2017-12-20 VITALS
HEART RATE: 107 BPM | DIASTOLIC BLOOD PRESSURE: 89 MMHG | OXYGEN SATURATION: 97 % | SYSTOLIC BLOOD PRESSURE: 143 MMHG | TEMPERATURE: 97.34 F

## 2017-12-20 VITALS
SYSTOLIC BLOOD PRESSURE: 120 MMHG | DIASTOLIC BLOOD PRESSURE: 67 MMHG | TEMPERATURE: 97.88 F | HEART RATE: 65 BPM | OXYGEN SATURATION: 95 %

## 2017-12-20 VITALS — OXYGEN SATURATION: 96 %

## 2017-12-20 VITALS
HEART RATE: 107 BPM | SYSTOLIC BLOOD PRESSURE: 143 MMHG | DIASTOLIC BLOOD PRESSURE: 89 MMHG | TEMPERATURE: 97.34 F | OXYGEN SATURATION: 97 %

## 2017-12-20 LAB
ANION GAP SERPL CALC-SCNC: 6 MMOL/L (ref 3–11)
BASOPHILS # BLD: 0.03 K/UL (ref 0–0.2)
BASOPHILS NFR BLD: 0.5 %
BUN SERPL-MCNC: 22 MG/DL (ref 7–18)
BUN/CREAT SERPL: 21.8 (ref 10–20)
CALCIUM SERPL-MCNC: 8.9 MG/DL (ref 8.5–10.1)
CHLORIDE SERPL-SCNC: 106 MMOL/L (ref 98–107)
CO2 SERPL-SCNC: 29 MMOL/L (ref 21–32)
COMPLETE: YES
CREAT CL PREDICTED SERPL C-G-VRATE: 55.7 ML/MIN
CREAT SERPL-MCNC: 1.01 MG/DL (ref 0.6–1.4)
EOSINOPHIL NFR BLD AUTO: 174 K/UL (ref 130–400)
GLUCOSE SERPL-MCNC: 97 MG/DL (ref 70–99)
HCT VFR BLD CALC: 43.7 % (ref 42–52)
IG%: 0.2 %
IMM GRANULOCYTES NFR BLD AUTO: 28.3 %
LYMPHOCYTES # BLD: 1.58 K/UL (ref 1.2–3.4)
MCH RBC QN AUTO: 31.4 PG (ref 25–34)
MCHC RBC AUTO-ENTMCNC: 33.2 G/DL (ref 32–36)
MCV RBC AUTO: 94.6 FL (ref 80–100)
MONOCYTES NFR BLD: 8.8 %
NEUTROPHILS # BLD AUTO: 6.6 %
NEUTROPHILS NFR BLD AUTO: 55.6 %
PMV BLD AUTO: 10.8 FL (ref 7.4–10.4)
POTASSIUM SERPL-SCNC: 3.7 MMOL/L (ref 3.5–5.1)
RBC # BLD AUTO: 4.62 M/UL (ref 4.7–6.1)
SODIUM SERPL-SCNC: 140 MMOL/L (ref 136–145)
WBC # BLD AUTO: 5.59 K/UL (ref 4.8–10.8)

## 2017-12-20 RX ADMIN — ATORVASTATIN CALCIUM SCH MG: 10 TABLET, FILM COATED ORAL at 08:54

## 2017-12-20 RX ADMIN — DILTIAZEM HYDROCHLORIDE SCH MG: 180 CAPSULE, EXTENDED RELEASE ORAL at 08:55

## 2017-12-20 RX ADMIN — CLONIDINE HYDROCHLORIDE SCH MG: 0.1 TABLET ORAL at 08:53

## 2017-12-20 RX ADMIN — APIXABAN SCH MG: 2.5 TABLET, FILM COATED ORAL at 08:54

## 2017-12-20 RX ADMIN — METOPROLOL TARTRATE SCH MG: 25 TABLET, FILM COATED ORAL at 08:54

## 2017-12-20 RX ADMIN — ISOSORBIDE MONONITRATE SCH MG: 30 TABLET, EXTENDED RELEASE ORAL at 08:54

## 2017-12-20 RX ADMIN — MEMANTINE HYDROCHLORIDE SCH MG: 10 TABLET ORAL at 08:54

## 2017-12-20 RX ADMIN — Medication SCH MG: at 08:53

## 2017-12-20 RX ADMIN — FINASTERIDE SCH MG: 5 TABLET, FILM COATED ORAL at 08:54

## 2017-12-20 RX ADMIN — CLONIDINE HYDROCHLORIDE SCH MG: 0.1 TABLET ORAL at 14:16
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
## 2017-12-20 NOTE — MEDICAL CONSULT
Consultation


Date of Consultation:


Dec 20, 2017.


Attending Physician:


Timothy Cruz MD, PhD


Reason for Consultation:


Resistant Staph UTI


History of Present Illness


85-year-old male with past medical history including dementia, BPH, hypertension

, atrial fibrillation/tachy-oly syndrome status post pacemaker, who was 

admitted to the hospital 1 day history of worsening mental status with 

aggressive behavior, complaining of chest pain.  Cardiac workup entirely 

negative.  Has been found to have evidence of urinary tract infection and 

culture has grown a relatively resistant coagulase negative Staph.  Patient had 

previously been on ceftriaxone.  Has allergies to penicillins.  Currently can 

provide no other relevant history.  Has been afebrile and hemodynamically 

stable.





Past Medical/Surgical History


Medical Problems:


(1) Abdominal pain


Status: Acute  





(2) Afib


Status: Acute  





(3) Altered mental status


Status: Acute  





(4) Atypical chest pain


Status: Acute  





(5) Chest wall pain


Status: Acute  





(6) Dehydration


Status: Acute  





(7) Elevated brain natriuretic peptide (BNP) level


Status: Acute  





(8) Hypotension


Status: Acute  





(9) New onset a-fib


Status: Acute  





(10) Substernal chest pain


Status: Acute  





(11) Syncope


Status: Acute  





(12) Urinary tract infection


Status: Acute  





Medical Problems:


(1) Dementia


(2) Encephalopathy due to infection


(3) Precordial chest pain


(4) Tachy-oly syndrome


(5) s/p pacemaker











Family History





Patient reports no known family medical history.





Social History


Smoking Status:  Unknown if Ever Smoked


Smokeless Tobacco Use:  Unknown


Alcohol Use:  unknown


Drug Use:  none


Marital Status:  


Housing Status:  nursing home


Occupation Status:  retired





Allergies


Coded Allergies:  


     Penicillins (Verified  Allergy, Intermediate, SWELLING, 17)





Current Inpatient Medications





Current Inpatient Medications








 Medications


  (Trade)  Dose


 Ordered  Sig/Yobani


 Route  Start Time


 Stop Time Status Last Admin


Dose Admin


 


 Acetaminophen


  (Tylenol Tab)  650 mg  Q4H  PRN


 PO  17 01:30


 18 01:29  17 07:42


650 MG


 


 Al Hydrox/Mg


 Hydrox/Simethicone


  (Maalox Max Susp)  15 ml  Q4H  PRN


 PO  17 01:30


 18 01:29   


 


 


 Magnesium


 Hydroxide


  (Milk Of


 Magnesia Susp)  30 ml  Q6H  PRN


 PO  17 01:30


 18 01:29   


 


 


 Polyethylene


  (Miralax Powder


 Packet)  17 gm  DAILY  PRN


 PO  17 01:30


 18 01:29   


 


 


 Zolpidem Tartrate


  (Ambien Tab)  5 mg  HSZ  PRN


 PO  17 01:30


 18 01:29   


 


 


 Ondansetron HCl


  (Zofran Inj)  4 mg  Q6H  PRN


 IV  17 01:30


 18 01:29   


 


 


 Aspirin


  (Ecotrin Tab)  81 mg  QAM


 PO  17 09:00


 18 08:59  17 08:53


81 MG


 


 Atorvastatin


 Calcium


  (Lipitor Tab)  10 mg  DAILY


 PO  17 09:00


 18 08:59  17 08:54


10 MG


 


 Clonidine HCl


  (Catapres Tab)  0.1 mg  TID


 PO  17 09:00


 18 08:59  17 08:53


0.1 MG


 


 Diltiazem HCl


  (TIAzac CAP)  360 mg  QAM


 PO  17 09:00


 18 08:59  17 08:55


360 MG


 


 Donepezil HCl


  (Aricept Tab)  10 mg  HS


 PO  17 21:00


 18 20:59  17 20:50


10 MG


 


 Finasteride


  (Proscar Tab)  5 mg  QAM


 PO  17 09:00


 18 08:59  17 08:54


5 MG


 


 Isosorbide


 Mononitrate


  (Imdur Ext Rel


 Tab)  30 mg  QAM


 PO  17 09:00


 18 08:59  17 08:54


30 MG


 


 Memantine


  (Namenda Tab)  10 mg  BID


 PO  17 09:00


 18 08:59  17 08:54


10 MG


 


 Metoprolol


 Tartrate


  (Lopressor Tab)  75 mg  BID


 PO  17 09:00


 18 08:59  17 08:54


75 MG


 


 Tamsulosin HCl


  (Flomax Cap)  0.4 mg  HS


 PO  17 21:00


 18 20:59  17 20:49


0.4 MG


 


 Apixaban


  (Eliquis Tab)  5 mg  BID


 PO  17 09:00


 18 08:59  17 08:54


5 MG


 


 Miscellaneous


  (Iv Fluids


 Completed)  1 ea  PRN  PRN


 N/A  17 02:00


 18 01:59   


 


 


 Pantoprazole


 Sodium


  (Protonix Tab)  40 mg  QAM


 PO  17 09:00


 17 23:59  17 08:54


40 MG


 


 Vancomycin HCl


  (Consult)  1 ea  UD  PRN


 N/A  17 07:43


 18 07:42   


 


 


 Vancomycin HCl


 2000 mg/Sodium


 Chloride  540 ml @ 


 200 mls/hr  0800


 IV  17 08:00


 17 18:00  17 08:16


200 MLS/HR











Review of Systems


Constitutional:  No fever, No chills


Eyes:  No problem reported


ENT:  No problem reported


Respiratory:  No problem reported


Cardiovascular:  + chest pain


Abdomen:  No problem reported


Musculoskeletal:  No problem reported


Genitourinary - Male:  No problem reported


Neurologic:  + problem reported (see HPI)


Psychiatric:  No problem reported


Endocrine:  No problem reported


Hematologic / Lymphatic:  No problem reported


Integumentary:  No problem reported


Allergic / Immunologic:  No problem reported





Physical Exam











  Date Time  Temp Pulse Resp B/P (MAP) Pulse Ox O2 Delivery O2 Flow Rate FiO2


 


17 07:43 36.3 107 20 143/89 (107) 97 Room Air  


 


17 00:00     96 Room Air  


 


17 23:39 36.7 78 18 146/80 (102) 95 Room Air  


 


17 20:47  81  141/76 (97)    


 


17 20:00      Room Air  


 


17 16:00      Room Air  


 


17 15:46 36.5 62 18 109/68 (82) 95 Room Air  


 


17 10:15    113/68 (83)    


 


17 10:15  68  79/42 (54) 96   


 


17 10:14    97/62 (74) 95   








General Appearance:  WD/WN, no apparent distress


Head:  normocephalic, atraumatic


Eyes:  normal inspection, EOMI, sclerae normal


ENT:  normal ENT inspection, pharynx normal


Neck:  supple, no adenopathy, thyroid normal, trachea midline


Respiratory/Chest:  chest non-tender, lungs clear, normal breath sounds, no 

respiratory distress


Cardiovascular:  no gallop, no murmur, + irregularly irregular


Abdomen/GI:  normal bowel sounds, non tender, soft, no organomegaly


Back:  normal inspection, no CVA tenderness


Extremities/Musculoskelatal:  no calf tenderness, non-tender


Neurologic/Psych:  alert, + disoriented


Skin:  normal color, warm/dry, no rash


Lymphatic:  no adenopathy





Laboratory Results





--------------------------------------------------------------------------------

------------





RUN DATE: 17                [facility]             

    PAGE 1   


RUN TIME: 954                            Specimen Inquiry                    


--------------------------------------------------------------------------------

------------





PATIENT: YAHIR WATSON                  ACCT #: [id number] LOC:  [id number]     U #

: [id number]


                                       AGE/SX: 85/M         ROOM: W260       REG

: 17


REG DR:  Timothy Cruz MD, PhD       :    1932   BED:  1          DIS

:         


                                       STATUS: ADM IN       TLOC:           


--------------------------------------------------------------------------------

------------








SPEC #: [id number]        COLLINS:      STATUS:  COMP           REQ 

#: [id number]


                            RECD:      ANTOLIN DR: Rocco,Kemal DAVILA MD        


SOURCE: UR, CC              ENTR:      SETH DR: Harpal Weston     

            


[facility]:                                                                        

            


ORDERED:  CULTURE URCLEAN                                                      

   


COMMENTS: Comments to Phlebotomist SAME TIME, DIFFERENT SITES         


          Has Specimen Been Obtained/Collected? Y


--------------------------------------------------------------------------------

------------





  Procedure                         Result                         Verified    

       Site


--------------------------------------------------------------------------------

------------





  URINE CULTURE  Final                                             


     Organism 1                     COAG NEG STAPHYLOCOCCUS


        COLONY COUNT                >100,000 CFU/ml


        SENS                        SENSITIVITY TO FOLLOW


        +MIX                        PLUS LOW COUNTS OTHER MIXED ALAYNA





     1. COAG NEG STAPHYLOCOCCUS


                       Target Route Dose               RX     AB   Cost M.I.C. 

   IQ    


                       ------ ----- ------------------ ------ -- ------ --------

- ------


       TRIMET/SULFA                                    R                >2/  

         


     * OXACILLIN                                       R                >2     

         


       VANCOMYCIN                                      S                2      

         


       TETRACYCLINE                                    R                >8     

         


       DAPTOMYCIN                                      S                <=0.5  

         


       NITROFURANTOIN                                  S                <=32   

         





         S = SENSITIVE  I = INTERMEDIATE  R = RESISTANT





--------------------------------------------------------------------------------

------------











Last 24 Hours








Test


  17


06:55


 


White Blood Count 5.59 K/uL 


 


Red Blood Count 4.62 M/uL 


 


Hemoglobin 14.5 g/dL 


 


Hematocrit 43.7 % 


 


Mean Corpuscular Volume 94.6 fL 


 


Mean Corpuscular Hemoglobin 31.4 pg 


 


Mean Corpuscular Hemoglobin


Concent 33.2 g/dl 


 


 


Platelet Count 174 K/uL 


 


Mean Platelet Volume 10.8 fL 


 


Neutrophils (%) (Auto) 55.6 % 


 


Lymphocytes (%) (Auto) 28.3 % 


 


Monocytes (%) (Auto) 8.8 % 


 


Eosinophils (%) (Auto) 6.6 % 


 


Basophils (%) (Auto) 0.5 % 


 


Neutrophils # (Auto) 3.11 K/uL 


 


Lymphocytes # (Auto) 1.58 K/uL 


 


Monocytes # (Auto) 0.49 K/uL 


 


Eosinophils # (Auto) 0.37 K/uL 


 


Basophils # (Auto) 0.03 K/uL 


 


RDW Standard Deviation 47.1 fL 


 


RDW Coefficient of Variation 13.7 % 


 


Immature Granulocyte % (Auto) 0.2 % 


 


Immature Granulocyte # (Auto) 0.01 K/uL 


 


Sodium Level 140 mmol/L 


 


Potassium Level 3.7 mmol/L 


 


Chloride Level 106 mmol/L 


 


Carbon Dioxide Level 29 mmol/L 


 


Anion Gap 6.0 mmol/L 


 


Blood Urea Nitrogen 22 mg/dl 


 


Creatinine 1.01 mg/dl 


 


Est Creatinine Clear Calc


Drug Dose 55.7 ml/min 


 


 


Estimated GFR (


American) 78.2 


 


 


Estimated GFR (Non-


American 67.5 


 


 


BUN/Creatinine Ratio 21.8 


 


Random Glucose 97 mg/dl 


 


Calcium Level 8.9 mg/dl 








                                                                               

                                                                 


Patient Name: YAHIR WATSON                               Unit Number:  

[id number]                  


 








 











Dictated: 17


 


Transcribed: 17


 


PBS


 


Printed Date/Time: [~ rep prt dt]/[~ rep prt tm]








 [~ rep ct labl] - [~ rep ct ivnm]


 





   [facility]


 Radiology Department


 [place] [address] [phone]





 











Dictated: 17


 


Transcribed: 17 2259


 


\[facility]\""


 


Printed Date/Time: [~ rep prt dt]/[~ rep prt tm]








 [~ rep ct labl] - [~ rep ct ivnm]


 








ABD/PELVIS NO IV OR ORAL CONT





CLINICAL HISTORY: 85 years-old Male presenting with lower abd pain. 





TECHNIQUE: Multidetector CT of the abdomen and pelvis was performed without the


use of intravenous contrast. IV contrast: None. A dose lowering technique was


used consistent with the principles of ALARA (as low as reasonably achievable). 





COMPARISON: 2017.





CT DOSE (mGy.cm): The estimated cumulative dose is 681.31 mGy.cm.





FINDINGS:





 topogram: Partially visualized single lead pacer to the right ventricular


apex and cardiomegaly.





Lung bases: Lung bases clear. Partially visualized pacer lead to the right


ventricular apex. Normal heart size. Coronary artery calcification. No


pericardial or pleural effusion. 





Liver: Normal morphology. Normal density.





Biliary: No gross biliary ductal dilatation allowing for noncontrast technique.


Normal gallbladder.





Pancreas: Mild parenchymal atrophy.





Spleen: Few splenic calcifications may indicate prior granulomatous infection.





Adrenal glands: Normal noncontrast appearance.





Kidneys and ureters: Hypodensity in the left kidney indeterminate but likely


cyst. A hypodensity may also be present at the right kidney but is poorly


assessed. No hydronephrosis. Nonobstructing 5 mm calculus in the interpolar


region of the left kidney. Renal vascular calcification also noted. Ureters


normal.





Bladder: Normal.





Pelvic organs: Prostate enlargement likely secondary to benign prostatic


hyperplasia.





Bowel: Normal. No bowel obstruction.





Peritoneal cavity: No free fluid or intraperitoneal gas.





Lymph nodes: No gross lymphadenopathy allowing for noncontrast technique. Few


prominent lymph nodes in the portacaval region, possibly reactive.





Vasculature: Atherosclerosis of the normal caliber abdominal aorta.





Abdominal wall: Small fat-containing umbilical hernia.





Musculoskeletal: Degenerative changes of the spine. Osteopenia.





IMPRESSION:


1.  No acute intra-abdominal pathology.





2.  Nonobstructing 5 mm left renal calculus.





3.  Prostatomegaly.





4.  Osteopenia.

















Electronically signed by:  Jose Bess M.D.


2017 11:04 PM





Dictated Date/Time:  2017 10:59 PM





 The status of this report is Signed.   


 Draft = Not yet reviewed or approved by Radiologist.  


 Signed = Reviewed and approved by Radiologist.


<AttendingPhy></AttendingPhy> <FamilyPhy>Laclede, Crest</FamilyPhy> <PrimaryPhy>

Laclede, Herkimer</PrimaryPhy> <UnitNumber>[id number]</UnitNumber> <VisitNumber>

[id number]</VisitNumber> <PatientName>YAHIR WATSON</PatientName> <DateOfBirth>

1932</DateOfBirth> <Location>[facility]</Location> <ServiceDate>17</

ServiceDate> <MNE>ESINDI</MNE> <OrderingPhy>Kemal Valiente MD</OrderingPhy> 

<OrderingPhyMNE>f rep ord dr mcgee</OrderingPhyMNE> <DictatingPhyMNE>f rep dict 

dr mcgee</DictatingPhyMNE> <CCListMNE>f rep ct mne</CCListMNE> <AdmittingPhyMNE>f 

pt admit dr mcgee</AdmittingPhyMNE> <AttendingPhyMNE>f pt attend dr mcgee</

AttendingPhyMNE>


<ConsultingPhyMNE>f pt consult dr mcgee</ConsultingPhyMNE> <FamilyPhyMNE>f pt fam 

dr mcgee</FamilyPhyMNE> <OtherPhyMNE>f pt other dr mcgee</OtherPhyMNE> <

PrimaryPhyMNE>f pt prim care dr mcgee</PrimaryPhyMNE> <ReferringPhyMNE>f pt 

referring dr mcgee</ReferringPhyMNE>





Assessment & Plan


Urinary tract infection with encephalopathy with resistant coagulase-negative 

Staph. Recommend Zyvox 600 mg bid for 10 days. Discussed with hospitalist.

## 2018-01-12 ENCOUNTER — HOSPITAL ENCOUNTER (INPATIENT)
Dept: HOSPITAL 45 - C.EDB | Age: 83
LOS: 1 days | Discharge: NURSING, REHAB OR LONG-TERM CARE | DRG: 373 | End: 2018-01-13
Attending: HOSPITALIST | Admitting: HOSPITALIST
Payer: COMMERCIAL

## 2018-01-12 VITALS
OXYGEN SATURATION: 96 % | DIASTOLIC BLOOD PRESSURE: 83 MMHG | HEART RATE: 112 BPM | SYSTOLIC BLOOD PRESSURE: 182 MMHG | TEMPERATURE: 98.06 F

## 2018-01-12 VITALS
TEMPERATURE: 97.52 F | HEART RATE: 98 BPM | OXYGEN SATURATION: 96 % | DIASTOLIC BLOOD PRESSURE: 80 MMHG | SYSTOLIC BLOOD PRESSURE: 191 MMHG

## 2018-01-12 VITALS
BODY MASS INDEX: 31.18 KG/M2 | WEIGHT: 194.01 LBS | HEIGHT: 66 IN | HEIGHT: 66 IN | WEIGHT: 194.01 LBS | BODY MASS INDEX: 31.18 KG/M2

## 2018-01-12 VITALS
TEMPERATURE: 97.52 F | DIASTOLIC BLOOD PRESSURE: 80 MMHG | OXYGEN SATURATION: 96 % | SYSTOLIC BLOOD PRESSURE: 191 MMHG | HEART RATE: 98 BPM

## 2018-01-12 VITALS — SYSTOLIC BLOOD PRESSURE: 146 MMHG | DIASTOLIC BLOOD PRESSURE: 77 MMHG | HEART RATE: 81 BPM

## 2018-01-12 VITALS — DIASTOLIC BLOOD PRESSURE: 78 MMHG | HEART RATE: 106 BPM | SYSTOLIC BLOOD PRESSURE: 157 MMHG

## 2018-01-12 VITALS — DIASTOLIC BLOOD PRESSURE: 110 MMHG | SYSTOLIC BLOOD PRESSURE: 179 MMHG

## 2018-01-12 DIAGNOSIS — F03.90: ICD-10-CM

## 2018-01-12 DIAGNOSIS — I10: ICD-10-CM

## 2018-01-12 DIAGNOSIS — Z79.82: ICD-10-CM

## 2018-01-12 DIAGNOSIS — N28.89: ICD-10-CM

## 2018-01-12 DIAGNOSIS — A04.72: Primary | ICD-10-CM

## 2018-01-12 DIAGNOSIS — E87.6: ICD-10-CM

## 2018-01-12 DIAGNOSIS — R41.82: ICD-10-CM

## 2018-01-12 DIAGNOSIS — Z87.891: ICD-10-CM

## 2018-01-12 DIAGNOSIS — I48.91: ICD-10-CM

## 2018-01-12 DIAGNOSIS — E78.5: ICD-10-CM

## 2018-01-12 DIAGNOSIS — Z66: ICD-10-CM

## 2018-01-12 LAB
BASOPHILS # BLD: 0.04 K/UL (ref 0–0.2)
BASOPHILS NFR BLD: 0.7 %
BUN SERPL-MCNC: 16 MG/DL (ref 7–18)
CA-I BLD-SCNC: 1.23 MMOL/L (ref 1.12–1.32)
CALCIUM SERPL-MCNC: 8.5 MG/DL (ref 8.5–10.1)
CK MB SERPL-MCNC: 0.7 NG/ML (ref 0.5–3.6)
CO2 SERPL-SCNC: 27 MMOL/L (ref 21–32)
CREAT BLD-MCNC: 0.9 MG/DL (ref 0.6–1.3)
CREAT SERPL-MCNC: 0.75 MG/DL (ref 0.6–1.4)
EOS ABS #: 0.26 K/UL (ref 0–0.5)
EOSINOPHIL NFR BLD AUTO: 208 K/UL (ref 130–400)
GLUCOSE SERPL-MCNC: 100 MG/DL (ref 70–99)
HCT VFR BLD CALC: 32.6 % (ref 42–52)
HGB BLD-MCNC: 11 G/DL (ref 14–18)
IG#: 0.01 K/UL (ref 0–0.02)
IMM GRANULOCYTES NFR BLD AUTO: 20.4 %
INR PPP: 1.1 (ref 0.9–1.1)
ISTAT POTASSIUM: 3.2 MEQ/L (ref 3.3–5)
LYMPHOCYTES # BLD: 1.12 K/UL (ref 1.2–3.4)
MCH RBC QN AUTO: 30.9 PG (ref 25–34)
MCHC RBC AUTO-ENTMCNC: 33.7 G/DL (ref 32–36)
MCV RBC AUTO: 91.6 FL (ref 80–100)
MONO ABS #: 0.75 K/UL (ref 0.11–0.59)
MONOCYTES NFR BLD: 13.7 %
NEUT ABS #: 3.31 K/UL (ref 1.4–6.5)
NEUTROPHILS # BLD AUTO: 4.7 %
NEUTROPHILS NFR BLD AUTO: 60.3 %
PMV BLD AUTO: 10.2 FL (ref 7.4–10.4)
POTASSIUM SERPL-SCNC: 3.2 MMOL/L (ref 3.5–5.1)
PTT PATIENT: 31.5 SECONDS (ref 21–31)
RED CELL DISTRIBUTION WIDTH CV: 13.4 % (ref 11.5–14.5)
RED CELL DISTRIBUTION WIDTH SD: 44.5 FL (ref 36.4–46.3)
SODIUM BLD-SCNC: 144 MEQ/L (ref 135–144)
SODIUM SERPL-SCNC: 141 MMOL/L (ref 136–145)
WBC # BLD AUTO: 5.49 K/UL (ref 4.8–10.8)

## 2018-01-12 RX ADMIN — APIXABAN SCH MG: 2.5 TABLET, FILM COATED ORAL at 19:55

## 2018-01-12 RX ADMIN — CLONIDINE HYDROCHLORIDE SCH MG: 0.1 TABLET ORAL at 18:55

## 2018-01-12 RX ADMIN — METOPROLOL TARTRATE SCH MG: 50 TABLET, FILM COATED ORAL at 18:54

## 2018-01-12 RX ADMIN — POTASSIUM CHLORIDE SCH MLS/HR: 10 INJECTION, SOLUTION INTRAVENOUS at 12:42

## 2018-01-12 RX ADMIN — POTASSIUM CHLORIDE SCH MLS/HR: 10 INJECTION, SOLUTION INTRAVENOUS at 16:42

## 2018-01-12 RX ADMIN — METRONIDAZOLE SCH MLS/HR: 500 INJECTION, SOLUTION INTRAVENOUS at 12:32

## 2018-01-12 RX ADMIN — POTASSIUM CHLORIDE SCH MLS/HR: 10 INJECTION, SOLUTION INTRAVENOUS at 15:21

## 2018-01-12 RX ADMIN — POTASSIUM CHLORIDE SCH MLS/HR: 10 INJECTION, SOLUTION INTRAVENOUS at 14:06

## 2018-01-12 RX ADMIN — METRONIDAZOLE SCH MLS/HR: 500 INJECTION, SOLUTION INTRAVENOUS at 20:23

## 2018-01-12 RX ADMIN — CHOLESTYRAMINE SCH GM: 4 POWDER, FOR SUSPENSION ORAL at 22:15

## 2018-01-12 RX ADMIN — MEMANTINE HYDROCHLORIDE SCH MG: 10 TABLET ORAL at 19:57

## 2018-01-12 NOTE — DIAGNOSTIC IMAGING REPORT
CT ABD/PELVIS IV CONTRAST ONLY



CLINICAL HISTORY: Diffuse abdominal pain. C. difficile.    



COMPARISON STUDY:  12/17/2017



TECHNIQUE: Following the IV administration of 118 mL of Optiray-320, CT scan of

the abdomen and pelvis was performed from the lung bases to the proximal femurs.

Images are reviewed in the axial, sagittal, and coronal planes. IV contrast was

administered without complication.  A dose lowering technique was utilized

adhering to the principles of ALARA.





CT DOSE: 1620.36 mGy.cm



FINDINGS:



Lower chest: There are mild right basilar atelectatic changes. The heart is

mildly enlarged.



Liver: The contrast-enhanced liver is normal in size, contour, and attenuation.

There is no intrahepatic biliary ductal dilatation. The hepatic veins and portal

veins are patent.



Gallbladder: Unremarkable.



Spleen: Normal in size and attenuation.



Pancreas: Unremarkable.



Adrenal glands: Unremarkable.



Kidneys: There is 12 mm right renal cortical cyst. There is a 36 mm mid pole

left renal cyst. There is a 6 mm nonobstructing left renal calculus. There is

mild infiltration of the left peripelvic renal fat. This a nonspecific finding

but should be correlated with any evidence of a urinary tract infection.



Bowel: There are no transition zones indicate bowel obstruction. There is mild

colonic wall thickening extending from the hepatic flexure to the rectum. The

findings are consistent with a colitis. There is no evidence of acute

diverticulitis. There is no evidence of acute appendicitis.



Peritoneum: There is no intraperitoneal free air or abdominal ascites.



Vasculature: The abdominal aorta is normal in course and caliber.



Adenopathy: None.



Pelvic viscera: The prostate is enlarged measuring 5.4 cm.



Skeletal structures: No destructive osseous lesions are seen.



IMPRESSION:  

1. Mild long segment colonic wall thickening extending from the hepatic flexure

to the rectum. The findings are consistent with a colitis

2. No evidence of bowel obstruction. No evidence of free air

3. Nonobstructing 6 mm left renal calculus

4. Slight increased density left renal pelvis with subtle infiltration of the

surrounding fat. While this may be secondary to an infectious/inflammatory

process, a uroepithelial neoplasm could appear similar. Clinical correlation is

advocated. A dedicated renal CT scan with delayed excretory phase imaging could

be obtained as deemed clinically indicated.

5. Prostamegaly







Electronically signed by:  Sean Randhawa M.D.

1/12/2018 8:17 AM



Dictated Date/Time:  1/12/2018 8:09 AM

## 2018-01-12 NOTE — EMERGENCY ROOM VISIT NOTE
History


Report prepared by El:  Maximiliano Garcia


Under the Supervision of:  Dr. Maged Martinez M.D.


First contact with patient:  07:06


Chief Complaint:  OTHER COMPLAINT


Stated Complaint:  CLAMMY,SWEATY,PAIN IN STOMACH,NOT MOVING RT ARM





History of Present Illness


This HPI is limited secondary to dementia. The patient is an 85 year old male 

who presents to the Emergency Room from [facility] 

after being found diaphoretic this morning. Upon arrival to the Emergency 

Department the patient is complaining of severe right lower quadrant abdominal 

pain. The [facility] nursing staff with the patient note that he was coming 

to the hospital for a US of the kidney for decreased blood flow.





   Source of History:  patient, EMS, nursing staff


   Onset:  this morning


   Position:  abdomen (RLQ)


   Symptom Intensity:  severe


   Associated Symptoms:  + diaphoresis





Review of Systems


See HPI for pertinent positives & negatives. A total of 10 systems reviewed and 

were otherwise negative.





Past Medical & Surgical


Medical Problems:


(1) A-fib


(2) Clostridium difficile colitis


(3) Dementia


(4) Encephalopathy due to infection


(5) HLD (hyperlipidemia)


(6) HTN (hypertension)


(7) Hypokalemia


(8) Precordial chest pain


(9) Tachy-oly syndrome








Family History





Patient reports no known family medical history.





Social History


Smoking Status:  Unknown if Ever Smoked


Drug Use:  none


Marital Status:  


Housing Status:  nursing home


Occupation Status:  retired





Current/Historical Medications


Scheduled


Apixaban (Eliquis), 5 MG PO BID


Aspirin (Aspirin Adult Low Dose), 81 MG PO QAM


Atorvastatin (Lipitor), 10 MG PO DAILY AT 1700


Bisacodyl (Bisac-Evac), 10 MG [place] DAILY


Cholestyramine (Cholestyramine Light), 4 GM PO BID@10,22


Citalopram Hydrobromide (Citalopram Hydrobromide), 10 MG PO HS


Clonidine Hcl (Catapres), 0.1 MG PO Q8


Clonidine Hcl (Catapres), 0.2 MG PO Q12


Diltiazem Hcl Ext Rel (Tiazac), 360 MG PO QAM


Donepezil Hydrochloride (Aricept), 10 MG PO HS


Finasteride (Proscar), 5 MG PO QAM


Isosorbide Mononitrate Ext Rel (Imdur Ext Rel), 30 MG PO QAM


Memantine (Namenda), 10 MG PO BID


Metoprolol Tartrate (Lopressor) (Lopressor), 75 MG PO BID


Pantoprazole (Pantoprazole Sodium), 40 MG PO QAM


Tamsulosin Hcl (Flomax), 0.4 MG PO HS


Vancomycin HCl (Vancomycin HCl + Syrspend), 250 MG PO Q6H





Scheduled PRN


Acetaminophen (Tylenol), 650 MG PO Q6H PRN for Pain or Fever


Promethazine Hcl (Phenergan), 25 MG PO Q6H PRN for Nausea





Miscellaneous Medications


Magnesium Hydroxide (Milk Of Magnesia), 30 ML PO


Sodium Phosphates (Fleet Enema Six Pack), 1 BTL [place]





Allergies


Coded Allergies:  


     Penicillins (Verified  Allergy, Intermediate, SWELLING, 1/12/18)





Physical Exam


Vital Signs











  Date Time  Temp Pulse Resp B/P (MAP) Pulse Ox O2 Delivery O2 Flow Rate FiO2


 


1/12/18 08:15  104 20 161/94 94 Room Air  


 


1/12/18 07:24  91      


 


1/12/18 07:08 36.6 88 20 119/74 98   











Physical Exam


GENERAL: Patient is demented. 


HEAD: Normocephalic atraumatic


EYES: Ocular movements intact pupils equal and react to light


OROPHARYNX mucous membranes are moist no exudates present no erythema or edema 

present


NECK: Supple no nuchal rigidity


CHEST: Good equal expansion


LUNGS: Clear and equal to auscultation


CARDIAC: Normal S1 and S2


ABDOMEN: Soft, and exquisitely tender to touch across the abdomen


BACK: No CVA tenderness


EXTREMITIES: No pain upon palpation normal muscle strength in all groups no 

clubbing cyanosis or edema


NEURO: Patient is following commands and answering questions appropriately. 

Alert and oriented x3 Cranial Nerves 2-12 grossly intact 


SKIN: Patient is diaphoretic on exam.





Medical Decision & Procedures


ER Provider


Diagnostic Interpretation:


Radiology results as stated below per my review and radiologist interpretation:





CT ABD/PELVIS IV CONTRAST ONLY





CLINICAL HISTORY: Diffuse abdominal pain. C. difficile.    





COMPARISON STUDY:  12/17/2017





TECHNIQUE: Following the IV administration of 118 mL of Optiray-320, CT scan of


the abdomen and pelvis was performed from the lung bases to the proximal femurs.


Images are reviewed in the axial, sagittal, and coronal planes. IV contrast was


administered without complication.  A dose lowering technique was utilized


adhering to the principles of ALARA.








CT DOSE: 1620.36 mGy.cm





FINDINGS:





Lower chest: There are mild right basilar atelectatic changes. The heart is


mildly enlarged.





Liver: The contrast-enhanced liver is normal in size, contour, and attenuation.


There is no intrahepatic biliary ductal dilatation. The hepatic veins and portal


veins are patent.





Gallbladder: Unremarkable.





Spleen: Normal in size and attenuation.





Pancreas: Unremarkable.





Adrenal glands: Unremarkable.





Kidneys: There is 12 mm right renal cortical cyst. There is a 36 mm mid pole


left renal cyst. There is a 6 mm nonobstructing left renal calculus. There is


mild infiltration of the left peripelvic renal fat. This a nonspecific finding


but should be correlated with any evidence of a urinary tract infection.





Bowel: There are no transition zones indicate bowel obstruction. There is mild


colonic wall thickening extending from the hepatic flexure to the rectum. The


findings are consistent with a colitis. There is no evidence of acute


diverticulitis. There is no evidence of acute appendicitis.





Peritoneum: There is no intraperitoneal free air or abdominal ascites.





Vasculature: The abdominal aorta is normal in course and caliber.





Adenopathy: None.





Pelvic viscera: The prostate is enlarged measuring 5.4 cm.





Skeletal structures: No destructive osseous lesions are seen.





IMPRESSION:  


1. Mild long segment colonic wall thickening extending from the hepatic flexure


to the rectum. The findings are consistent with a colitis


2. No evidence of bowel obstruction. No evidence of free air


3. Nonobstructing 6 mm left renal calculus


4. Slight increased density left renal pelvis with subtle infiltration of the


surrounding fat. While this may be secondary to an infectious/inflammatory


process, a uroepithelial neoplasm could appear similar. Clinical correlation is


advocated. A dedicated renal CT scan with delayed excretory phase imaging could


be obtained as deemed clinically indicated.


5. Prostamegaly











Electronically signed by:  Sean Randhawa M.D.


1/12/2018 8:17 AM





Dictated Date/Time:  1/12/2018 8:09 AM








SINGLE VIEW CHEST





CLINICAL HISTORY:  Strokelike symptoms.





FINDINGS: An AP, portable, upright chest radiograph is compared to study dated


12/17/2017. The examination is degraded by portable technique and patient


rotation. A single lead cardiac pacemaker is unchanged in position and partially


obscures the left upper chest. The heart is enlarged and there is


atherosclerotic calcification of the thoracic aorta. The pulmonary vasculature


is noncongested. The lungs and pleural spaces are clear. No pneumothorax is


seen. The skeletal structures are osteopenic. The bony thorax is grossly intact.





IMPRESSION:





1. Cardiomegaly and cardiac pacemaker without radiographic evidence of


congestive failure.





2. No airspace consolidation or pleural effusion is identified.











Electronically signed by:  Douglas Blunt M.D.


1/12/2018 7:52 AM





Dictated Date/Time:  1/12/2018 7:51 AM








CT HEAD WITHOUT CONTRAST (CT)





CLINICAL HISTORY: Stroke. Altered mental status. Weakness.    





COMPARISON STUDY:  12/17/2017





TECHNIQUE:  Axial CT of the brain is performed from the vertex to the skull


base. IV contrast was not administered for this examination. A dose lowering


technique was utilized adhering to the principles of ALARA.


 





CT DOSE:    





FINDINGS:





No intra or extra-axial mass lesions are visualized. There is no CT evidence of


acute cortical infarction. There is no evidence of midline shift. There is no


acute  hemorrhage. No calvarial fractures are visualized. 


There are minimal white matter hypodensities likely on a small vessel basis.


There is stable mild ventricular dilatation.


There is no evidence of acute sinusitis





IMPRESSION: Stable mild ventricular dilatation. No acute intracranial findings.











Electronically signed by:  Sean Randhawa M.D.


1/12/2018 8:08 AM





Dictated Date/Time:  1/12/2018 8:07 AM





Laboratory Results











Test


  1/12/18


07:20 1/12/18


07:24 1/12/18


07:27 1/12/18


08:30


 


Prothrombin Time


  11.9 SECONDS


(9.0-12.0) 


  


  


 


 


Prothromb Time International


Ratio 1.1 (0.9-1.1) 


  


  


  


 


 


Activated Partial


Thromboplast Time 31.5 SECONDS


(21.0-31.0) 


  


  


 


 


Partial Thromboplastin Ratio 1.2    


 


Magnesium Level


  1.9 mg/dl


(1.8-2.4) 


  


  


 


 


Total Creatine Kinase


  28 U/L


() 


  


  


 


 


Creatine Kinase MB


  0.7 ng/ml


(0.5-3.6) 


  


  


 


 


Creatine Kinase MB Ratio 2.5 (0-3.0)    


 


Troponin I


  < 0.015 ng/ml


(0-0.045) 


  


  


 


 


Bedside Glucose


  


  98 mg/dl


(70-99) 


  


 


 


Bedside Hemoglobin


  


  


  10.2 g/dl


(14.0-18.0) 


 


 


Bedside Hematocrit   30 % (42-52)  


 


Bedside Sodium


  


  


  144 mEq/L


(135-144) 


 


 


Bedside Potassium


  


  


  3.2 mEq/L


(3.3-5.0) 


 


 


Bedside Chloride


  


  


  95 mEq/L


(101-112) 


 


 


Bedside Total CO2


  


  


  26 mEq/l


(24-31) 


 


 


Bedside Blood Urea Nitrogen


  


  


  21 mg/dl


(7-18) 


 


 


Bedside Creatinine


  


  


  0.9 mg/dl


(0.6-1.3) 


 


 


Bedside Glucose (other)


  


  


  101 mg/dl


(70-99) 


 


 


Bedside Ionized Calcium (Tommie)


  


  


  1.23 mmol/l


(1.12-1.32) 


 


 


Urine Color    DK YELLOW 


 


Urine Appearance    TURBID (CLEAR) 


 


Urine pH    5.5 (4.5-7.5) 


 


Urine Specific Gravity


  


  


  


  1.029


(1.000-1.030)


 


Urine Protein    1+ (NEG) 


 


Urine Glucose (UA)    NEG (NEG) 


 


Urine Ketones    NEG (NEG) 


 


Urine Occult Blood    3+ (NEG) 


 


Urine Nitrite    POS (NEG) 


 


Urine Bilirubin    NEG (NEG) 


 


Urine Urobilinogen    NEG (NEG) 


 


Urine Leukocyte Esterase    LARGE (NEG) 


 


Urine WBC (Auto)    >30 /hpf (0-5) 


 


Urine RBC (Auto)


  


  


  


  10-30 /hpf


(0-4)


 


Urine Hyaline Casts (Auto)    1-5 /lpf (0-5) 


 


Urine Epithelial Cells (Auto)


  


  


  


  5-10 /lpf


(0-5)


 


Urine Bacteria (Auto)    3+ (NEG) 


 


Urine Crystals


  


  


  


  CALCIUM


OXALATE (NONE


 


Urine Yeast (Auto)     (NONE PRSENT) 





Labs reviewed by ED physician.





Medications Administered











 Medications


  (Trade)  Dose


 Ordered  Sig/Yobani


 Route  Start Time


 Stop Time Status Last Admin


Dose Admin


 


 Sodium Chloride  1,000 ml @ 


 50 mls/hr  Q20H


 IV  1/12/18 07:15


 1/12/18 12:17 DC 1/12/18 08:57


50 MLS/HR


 


 Sodium Chloride  500 ml @ 


 999 mls/hr  Q31M STAT


 IV  1/12/18 07:15


 1/12/18 07:45 DC 1/12/18 07:49


999 MLS/HR


 


 Potassium Chloride


  (Hayde Ciel Elix)  40 meq  NOW  STAT


 PO  1/12/18 08:06


 1/12/18 08:07 DC 1/12/18 10:09


40 MEQ


 


 Potassium Chloride


  (Hayde Ciel Elix)  40 meq  NOW  STAT


 PO  1/12/18 09:12


 1/12/18 09:13 DC 1/12/18 10:09


40 MEQ











ECG


Indication:  diaphoresis


Rate (beats per minute):  103


Rhythm:  atrial fibrillation (W/ RVR)


Findings:  T-wave inversion





ED Course


0706: Past medical records reviewed. The patient was evaluated in room A3. A 

complete history and physical examination was performed. 





0715: Ordered Sodium Chloride 500 mL @ 999 mL/hr IV, Sodium Chloride 1000 mL @ 

50 mL/hr IV. 





0806: Ordered Potassium Chloride 40 meq PO. 





0912: Ordered Potassium Chloride 40 meq PO. 





0937: I discussed the case with Dr. Winters at this time. He will evaluate the 

patient for further treatment.





Medical Decision


Differential diagnosis:


Etiologies such as appendicitis, diverticulitis, PUD, biliary pathology, UTI, 

pancreatitis, obstruction, mesenteric ischemia, aortic pathology, infections, 

inflammatory bowel disease, renal colic, as well as others were entertained.





This is an 85-year-old male who presents emergency department complaining of 

altered mental status.  The patient is being treated for C. difficile.  His had 

a number of bowel movements here in the emergency department.  He was sent for 

CAT scan of the abdomen pelvis.  Serial abdominal examinations were performed 

on the patient in the emergency department and no tended patient exhibit a 

surgical abdomen.  Based on these findings I felt that the patient can be 

admitted safely to the medicine service.  Patient and family were in agreement 

with the treatment plan.





Blood Pressure Screening


Patient's blood pressure:  Elevated blood pressure


Blood pressure disposition:  Referred to PCP





Consults


Time Called:  0930


Consulting Physician:  Dr. Winters


Returned Call:  0937


I discussed the case with Dr. Winters at this time. He will evaluate the patient 

for further treatment.





Impression





 Primary Impression:  


 C. difficile colitis


 Additional Impressions:  


 Diarrhea


 Altered mental status





Scribe Attestation


The scribe's documentation has been prepared under my direction and personally 

reviewed by me in its entirety. I confirm that the note above accurately 

reflects all work, treatment, procedures, and medical decision making performed 

by me.





Departure Information


Dispostion


Being Evaluated By Hospitalist





Prescriptions





Cholestyramine (Cholestyramine Light) 4 Gm Pack


4 GM PO BID@10,22 for 7 Days


   Prov: Sudhakar Burk D.OVernon         1/13/18 


Vancomycin HCl (Vancomycin HCl + Syrspend) 50 Mg/Ml [place]


250 MG PO Q6H for 14 Days


   Prov: Sudhakar Burk D.O.         1/13/18





Referrals


McLeod, Crest (PCP)





Patient Instructions


My [facility]





Problem Qualifiers








 Additional Impressions:  


 Diarrhea


 Diarrhea type:  infectious  Qualified Codes:  A09 - Infectious gastroenteritis 

and colitis, unspecified


 Altered mental status


 Altered mental status type:  unspecified  Qualified Codes:  R41.82 - Altered 

mental status, unspecified

## 2018-01-12 NOTE — DIAGNOSTIC IMAGING REPORT
CT HEAD WITHOUT CONTRAST (CT)



CLINICAL HISTORY: Stroke. Altered mental status. Weakness.    



COMPARISON STUDY:  12/17/2017



TECHNIQUE:  Axial CT of the brain is performed from the vertex to the skull

base. IV contrast was not administered for this examination. A dose lowering

technique was utilized adhering to the principles of ALARA.

 



CT DOSE:    



FINDINGS:



No intra or extra-axial mass lesions are visualized. There is no CT evidence of

acute cortical infarction. There is no evidence of midline shift. There is no

acute  hemorrhage. No calvarial fractures are visualized. 

There are minimal white matter hypodensities likely on a small vessel basis.

There is stable mild ventricular dilatation.

There is no evidence of acute sinusitis



IMPRESSION: Stable mild ventricular dilatation. No acute intracranial findings.







Electronically signed by:  Sean Randhawa M.D.

1/12/2018 8:08 AM



Dictated Date/Time:  1/12/2018 8:07 AM

## 2018-01-12 NOTE — DIAGNOSTIC IMAGING REPORT
SINGLE VIEW CHEST



CLINICAL HISTORY:  Strokelike symptoms.



FINDINGS: An AP, portable, upright chest radiograph is compared to study dated

12/17/2017. The examination is degraded by portable technique and patient

rotation. A single lead cardiac pacemaker is unchanged in position and partially

obscures the left upper chest. The heart is enlarged and there is

atherosclerotic calcification of the thoracic aorta. The pulmonary vasculature

is noncongested. The lungs and pleural spaces are clear. No pneumothorax is

seen. The skeletal structures are osteopenic. The bony thorax is grossly intact.



IMPRESSION:



1. Cardiomegaly and cardiac pacemaker without radiographic evidence of

congestive failure.



2. No airspace consolidation or pleural effusion is identified.







Electronically signed by:  Douglas Blunt M.D.

1/12/2018 7:52 AM



Dictated Date/Time:  1/12/2018 7:51 AM

## 2018-01-12 NOTE — HISTORY AND PHYSICAL
History & Physical


Date & Time of Service:


Jan 12, 2018 at 10:25


Chief Complaint:


Clammy,Sweaty,Pain In Stomach,Not Moving Rt Arm


Primary Care Physician:


Success, Crest


History of Present Illness


Source:  patient


This is a 84 yo M with PMHx of afib, tachy crady syndrome s/p pacemaker on 

Eliquis, HTN, HLD, moderate to severe dementia with hx of depression with 

suicidal ideation, and BPH who presents from [facility] to our facility for 

increased diarrhea over the past few days.  The patient has recently been 

admitted to [facility] for a UTI with behavioral distrubances at the [facility], where he 

was discharged to [facility] on 12/20/17 with a 10 day course of linezolid 

for UTI, due to multiple organism resistance.  The patient was seen by 

physician at [facility] for an acute visit on 1/8 where he was noted to 

having increased abdominal pain, nausea, vomiting, and excessive diarrhea.  The 

patient reports he is having multiple bouts of diarrhea still, and that stools 

are loose and watery.  He has a decreased appetite.  Per nursing home notes, a 

c. diff culture was collected and is positive.  





The patient is a poor historian due to dementia, although is able to recall 

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  

He also conducted a crew.  Pt continues to ask nursing where his crew is 

throughout the morning in the ER.  Pt is currently oriented and knows he is in 

a hospital, but thinks he could be going home today.  He is also concerned that 

his wife doesn't know where he is. 





Here in the ER the pt is being treated with IVFs, has been given potassium 

supplementation in a liquid form to drink but refuses due to taste at this point

, and also continues to complain of abdominal pain.  


Imaging studies reviewed with + large bowel wall thickening consistent with 

colitis. Also with new Left renal mass in per CT, with a nonobstructing renal 

calculus measuring 6 mm.





Past Medical/Surgical History


Medical Problems:


(1) A-fib


(2) Clostridium difficile colitis


(3) Dementia


(4) Encephalopathy due to infection


(5) HLD (hyperlipidemia)


(6) HTN (hypertension)


(7) Hypokalemia


(8) Precordial chest pain


(9) Tachy-oly syndrome





Family History





Patient reports no known family medical history.


The patient is unable to provide family history due to dementia.





Social History


Smoking Status:  Former Smoker


Smokeless Tobacco Use:  Unknown


Alcohol Use:  none


Drug Use:  none


Marital Status:  


Housing status:  nursing home


Occupational Status:  retired





Immunizations


History of Influenza Vaccine:  Unknown


History of Tetanus Vaccine?:  Unknown


History of Pneumococcal:  Unknown


History of Hepatitis B Vaccine:  Unknown





Multi-Drug Resistant Organisms


History of MDRO:  No





Allergies


Coded Allergies:  


     Penicillins (Verified  Allergy, Intermediate, SWELLING, 1/12/18)





Home Medications


Scheduled


Apixaban (Eliquis), 5 MG PO BID


Aspirin (Aspirin Adult Low Dose), 81 MG PO QAM


Atorvastatin (Lipitor), 10 MG PO DAILY AT 1700


Bisacodyl (Bisac-Evac), 10 MG MA DAILY


Citalopram Hydrobromide (Citalopram Hydrobromide), 10 MG PO HS


Clonidine Hcl (Catapres), 0.1 MG PO Q8


Clonidine Hcl (Catapres), 0.2 MG PO Q12


Diltiazem Hcl Ext Rel (Tiazac), 360 MG PO QAM


Donepezil Hydrochloride (Aricept), 10 MG PO HS


Finasteride (Proscar), 5 MG PO QAM


Isosorbide Mononitrate Ext Rel (Imdur Ext Rel), 30 MG PO QAM


Memantine (Namenda), 10 MG PO BID


Metoprolol Tartrate (Lopressor) (Lopressor), 75 MG PO BID


Pantoprazole (Pantoprazole Sodium), 40 MG PO QAM


Tamsulosin Hcl (Flomax), 0.4 MG PO HS





Scheduled PRN


Acetaminophen (Tylenol), 650 MG PO Q6H PRN for Pain or Fever


Promethazine Hcl (Phenergan), 25 MG PO Q6H PRN for Nausea





Miscellaneous Medications


Magnesium Hydroxide (Milk Of Magnesia), 30 ML PO


Sodium Phosphates (Fleet Enema Six Pack), 1 BTL MA





Review of Systems


Constitutional:  No fever, No chills, No sweats, No weight loss, No fatigue


Eyes:  No redness, No discharge, No diplopia


ENT:  No unusual epistaxis, No nasal symptoms, No sore throat


Respiratory:  No cough, No sputum, No wheezing, No shortness of breath, No 

dyspnea on exertion


Cardiovascular:  No chest pain, No edema, No palpitations


Abdomen:  + pain, + nausea, + diarrhea, No GI bleeding


Musculoskeletal:  No joint pain, No swelling


Genitourinary - Male:  No hematuria, No dysuria


Neurologic:  + memory loss, No numbness/tingling


Psychiatric:  + depression symptoms


Endocrine:  No fatigue


Integumentary:  No rash, No itch





Physical Exam


Vital Signs











  Date Time  Temp Pulse Resp B/P (MAP) Pulse Ox O2 Delivery O2 Flow Rate FiO2


 


1/12/18 08:15  104 20 161/94 94 Room Air  


 


1/12/18 07:24  91      


 


1/12/18 07:08 36.6 88 20 119/74 98   








General Appearance:  WD/WN, no apparent distress, + pertinent finding (

pleasantly demented)


Head:  normocephalic, atraumatic


Eyes:  PERRL, EOMI


ENT:  hearing grossly normal, pharynx normal


Neck:  supple, no JVD


Respiratory/Chest:  lungs clear, no respiratory distress, no accessory muscle 

use, + pertinent finding (on RA)


Cardiovascular:  no JVD, + systolic murmur, + irregularly irregular


Abdomen/GI:  normal bowel sounds, soft, + tenderness (generalized with light 

palpation + guarding. ), + pertinent finding


Back:  normal inspection, no CVA tenderness


Extremities/Musculoskelatal:  no calf tenderness, + pedal edema (mild 

nonpitting BLE)


Neurologic/Psych:  alert, normal reflexes, oriented x 3, + disoriented (knows he

's in a hospital, not oriented to date or time, difficulty with recalling 

recent events, asking about  crew )


Skin:  normal color, warm/dry





Diagnostics


Laboratory Results





Results Past 24 Hours








Test


  1/12/18


07:20 1/12/18


07:24 1/12/18


07:27 1/12/18


08:30 Range/Units


 


 


White Blood Count 5.49    4.8-10.8  K/uL


 


Red Blood Count 3.56    4.7-6.1  M/uL


 


Hemoglobin 11.0    14.0-18.0  g/dL


 


Hematocrit 32.6    42-52  %


 


Mean Corpuscular Volume 91.6      fL


 


Mean Corpuscular Hemoglobin 30.9    25-34  pg


 


Mean Corpuscular Hemoglobin


Concent 33.7


  


  


  


  32-36  g/dl


 


 


Platelet Count 208    130-400  K/uL


 


Mean Platelet Volume 10.2    7.4-10.4  fL


 


Neutrophils (%) (Auto) 60.3     %


 


Lymphocytes (%) (Auto) 20.4     %


 


Monocytes (%) (Auto) 13.7     %


 


Eosinophils (%) (Auto) 4.7     %


 


Basophils (%) (Auto) 0.7     %


 


Neutrophils # (Auto) 3.31    1.4-6.5  K/uL


 


Lymphocytes # (Auto) 1.12    1.2-3.4  K/uL


 


Monocytes # (Auto) 0.75    0.11-0.59  K/uL


 


Eosinophils # (Auto) 0.26    0-0.5  K/uL


 


Basophils # (Auto) 0.04    0-0.2  K/uL


 


RDW Standard Deviation 44.5    36.4-46.3  fL


 


RDW Coefficient of Variation 13.4    11.5-14.5  %


 


Immature Granulocyte % (Auto) 0.2     %


 


Immature Granulocyte # (Auto) 0.01    0.00-0.02  K/uL


 


Prothrombin Time


  11.9


  


  


  


  9.0-12.0


SECONDS


 


Prothromb Time International


Ratio 1.1


  


  


  


  0.9-1.1  


 


 


Activated Partial


Thromboplast Time 31.5


  


  


  


  21.0-31.0


SECONDS


 


Partial Thromboplastin Ratio 1.2     


 


Sodium Level 141    136-145  mmol/L


 


Potassium Level 3.2    3.5-5.1  mmol/L


 


Chloride Level 107      mmol/L


 


Carbon Dioxide Level 27    21-32  mmol/L


 


Anion Gap 7.0  27.0  16-25  mmol/L


 


Blood Urea Nitrogen 16    7-18  mg/dl


 


Creatinine


  0.75


  


  


  


  0.60-1.40


mg/dl


 


Est Creatinine Clear Calc


Drug Dose 78.5


  


  


  


   ml/min


 


 


Estimated GFR (


American) 96.9


  


  


  


   


 


 


Estimated GFR (Non-


American 83.6


  


  


  


   


 


 


BUN/Creatinine Ratio 20.8    10-20  


 


Random Glucose 100    70-99  mg/dl


 


Calcium Level 8.5    8.5-10.1  mg/dl


 


Magnesium Level 1.9    1.8-2.4  mg/dl


 


Total Creatine Kinase 28      U/L


 


Creatine Kinase MB 0.7    0.5-3.6  ng/ml


 


Creatine Kinase MB Ratio 2.5    0-3.0  


 


Troponin I < 0.015    0-0.045  ng/ml


 


Bedside Glucose  98   70-99  mg/dl


 


Bedside Hemoglobin   10.2  14.0-18.0  g/dl


 


Bedside Hematocrit   30  42-52  %


 


Bedside Sodium   144  135-144  mEq/L


 


Bedside Potassium   3.2  3.3-5.0  mEq/L


 


Bedside Chloride   95  101-112  mEq/L


 


Bedside Total CO2   26  24-31  mEq/l


 


Bedside Blood Urea Nitrogen   21  7-18  mg/dl


 


Bedside Creatinine   0.9  0.6-1.3  mg/dl


 


Bedside Glucose (other)   101  70-99  mg/dl


 


Bedside Ionized Calcium (Tommie)


  


  


  1.23


  


  1.12-1.32


mmol/l


 


Urine Color    DK YELLOW  


 


Urine Appearance    TURBID CLEAR  


 


Urine pH    5.5 4.5-7.5  


 


Urine Specific Gravity    1.029 1.000-1.030  


 


Urine Protein    1+ NEG  


 


Urine Glucose (UA)    NEG NEG  


 


Urine Ketones    NEG NEG  


 


Urine Occult Blood    3+ NEG  


 


Urine Nitrite    POS NEG  


 


Urine Bilirubin    NEG NEG  


 


Urine Urobilinogen    NEG NEG  


 


Urine Leukocyte Esterase    LARGE NEG  


 


Urine WBC (Auto)    >30 0-5  /hpf


 


Urine RBC (Auto)    10-30 0-4  /hpf


 


Urine Hyaline Casts (Auto)    1-5 0-5  /lpf


 


Urine Epithelial Cells (Auto)    5-10 0-5  /lpf


 


Urine Bacteria (Auto)    3+ NEG  


 


Urine Crystals    CALCIUM OXALATE NONE PRSENT  


 


Urine Yeast (Auto)     NONE PRSENT  








Microbiology Results


1/12/18 Urine Culture, Received


          Pending





Diagnostic Radiology


CT ABD/PELVIS IV CONTRAST ONLY





CLINICAL HISTORY: Diffuse abdominal pain. C. difficile.    





COMPARISON STUDY:  12/17/2017





TECHNIQUE: Following the IV administration of 118 mL of Optiray-320, CT scan of


the abdomen and pelvis was performed from the lung bases to the proximal femurs.


Images are reviewed in the axial, sagittal, and coronal planes. IV contrast was


administered without complication.  A dose lowering technique was utilized


adhering to the principles of ALARA.








CT DOSE: 1620.36 mGy.cm





FINDINGS:





Lower chest: There are mild right basilar atelectatic changes. The heart is


mildly enlarged.





Liver: The contrast-enhanced liver is normal in size, contour, and attenuation.


There is no intrahepatic biliary ductal dilatation. The hepatic veins and portal


veins are patent.





Gallbladder: Unremarkable.





Spleen: Normal in size and attenuation.





Pancreas: Unremarkable.





Adrenal glands: Unremarkable.





Kidneys: There is 12 mm right renal cortical cyst. There is a 36 mm mid pole


left renal cyst. There is a 6 mm nonobstructing left renal calculus. There is


mild infiltration of the left peripelvic renal fat. This a nonspecific finding


but should be correlated with any evidence of a urinary tract infection.





Bowel: There are no transition zones indicate bowel obstruction. There is mild


colonic wall thickening extending from the hepatic flexure to the rectum. The


findings are consistent with a colitis. There is no evidence of acute


diverticulitis. There is no evidence of acute appendicitis.





Peritoneum: There is no intraperitoneal free air or abdominal ascites.





Vasculature: The abdominal aorta is normal in course and caliber.





Adenopathy: None.





Pelvic viscera: The prostate is enlarged measuring 5.4 cm.





Skeletal structures: No destructive osseous lesions are seen.





IMPRESSION:  


1. Mild long segment colonic wall thickening extending from the hepatic flexure


to the rectum. The findings are consistent with a colitis


2. No evidence of bowel obstruction. No evidence of free air


3. Nonobstructing 6 mm left renal calculus


4. Slight increased density left renal pelvis with subtle infiltration of the


surrounding fat. While this may be secondary to an infectious/inflammatory


process, a uroepithelial neoplasm could appear similar. Clinical correlation is


advocated. A dedicated renal CT scan with delayed excretory phase imaging could


be obtained as deemed clinically indicated.


5. Prostamegaly





SINGLE VIEW CHEST





CLINICAL HISTORY:  Strokelike symptoms.





FINDINGS: An AP, portable, upright chest radiograph is compared to study dated


12/17/2017. The examination is degraded by portable technique and patient


rotation. A single lead cardiac pacemaker is unchanged in position and partially


obscures the left upper chest. The heart is enlarged and there is


atherosclerotic calcification of the thoracic aorta. The pulmonary vasculature


is noncongested. The lungs and pleural spaces are clear. No pneumothorax is


seen. The skeletal structures are osteopenic. The bony thorax is grossly intact.





IMPRESSION:





1. Cardiomegaly and cardiac pacemaker without radiographic evidence of


congestive failure.





2. No airspace consolidation or pleural effusion is identified.











Electronically signed by:  Douglas Blunt M.D.


1/12/2018 7:52 AM





Dictated Date/Time:  1/12/2018 7:51 AM





 The status of this report is Signed.   





CT HEAD WITHOUT CONTRAST (CT)





CLINICAL HISTORY: Stroke. Altered mental status. Weakness.    





COMPARISON STUDY:  12/17/2017





TECHNIQUE:  Axial CT of the brain is performed from the vertex to the skull


base. IV contrast was not administered for this examination. A dose lowering


technique was utilized adhering to the principles of ALARA.


 





CT DOSE:    





FINDINGS:





No intra or extra-axial mass lesions are visualized. There is no CT evidence of


acute cortical infarction. There is no evidence of midline shift. There is no


acute  hemorrhage. No calvarial fractures are visualized. 


There are minimal white matter hypodensities likely on a small vessel basis.


There is stable mild ventricular dilatation.


There is no evidence of acute sinusitis





IMPRESSION: Stable mild ventricular dilatation. No acute intracranial findings.











Electronically signed by:  Sean Randhawa M.D.


1/12/2018 8:08 AM





Dictated Date/Time:  1/12/2018 8:07 AM





 The status of this report is Signed.





Impression


Assessment and Plan


This is a 84 yo M with PMHx of afib, tachy crady syndrome s/p pacemaker on 

Eliquis, HTN, HLD, moderate to severe dementia with hx of depression with 

suicidal ideation, and BPH who presents from [facility] to our facility for 

increased diarrhea over the past few days.  The patient has recently been 

admitted to [facility] for a UTI with behavioral distrubances at the [facility], where he 

was discharged to [facility] on 12/20/17 with a 10 day course of linezolid 

for UTI, due to multiple organism resistance.  The patient was seen by 

physician at [facility] for an acute visit on 1/8 where he was noted to 

having increased abdominal pain, nausea, vomiting, and excessive diarrhea.  The 

patient reports he is having multiple bouts of diarrhea still, and that stools 

are loose and watery.  He has a decreased appetite.  Per nursing home notes, a 

c. diff culture was collected and is positive.  








Colitis, C. difficile


- Admit to med/surg


- Start on flagyl  IV due to nausea and abdominal pain, hopefully transition to 

PO flagyl soon - likely contracted C. diff since being recently treated for UTI 

on linezolid ( Finished 1/1) and possible c diff exposure at [facility] 

facility.  


- Checking c. diff culture and stool studies to confirm


- Started on cholestyramine for supportive care, immodium. 


- Recieved  mL bolus in the ER - hold additional fluids at this time as 

vital stable, and pt requesting a diet- Allow a heart healthy diet at this time





Hypokalemia


- Replaced via IV as pt did not tolerate PO intake. 


- Monitor prp





Left Renal Mass


- CT reviewed as above


- May require urology consultation as this appears to be a new finding, 6 mm 

nonobstructing calculus also present but pt denies urinary symptoms. 





Afib


- Continue Eliquis 





HTN


- Continue clonidine 0.2 mg Q12H, pt does have home med of 0.1 mg Q8H prn for 

systolic > 170, will monitor for now. 





HLD


- Continue statin therapy





DVT ppx: Eliquis, teds, scds





CODE STATUS: DNR





Disposition: From [facility], PT/OT to [facility]  to assist with discharge 

planning.





i personally examined pt and verified all key points bianca Driscoll PAC


abdominal pain about the same


vitals noted nad breathing unlabored no pallor or icterus


Cdiff colitis - flagyl, supportive care


otherwise as above





Level of Care


Med/Surg





Resuscitation Status


DO NOT RESUSCITATE





VTE Prophylaxis


VTE Risk Assessment Done? Y/N:  Yes


Risk Level:  Low


Given or contraindicated:  Other Anticoagulation, T.E.D. Stockings, SCD's

## 2018-01-13 VITALS
HEART RATE: 103 BPM | OXYGEN SATURATION: 96 % | SYSTOLIC BLOOD PRESSURE: 179 MMHG | TEMPERATURE: 98.24 F | DIASTOLIC BLOOD PRESSURE: 80 MMHG

## 2018-01-13 VITALS
SYSTOLIC BLOOD PRESSURE: 179 MMHG | TEMPERATURE: 98.24 F | DIASTOLIC BLOOD PRESSURE: 80 MMHG | OXYGEN SATURATION: 96 % | HEART RATE: 103 BPM

## 2018-01-13 VITALS
DIASTOLIC BLOOD PRESSURE: 69 MMHG | SYSTOLIC BLOOD PRESSURE: 139 MMHG | TEMPERATURE: 97.34 F | HEART RATE: 86 BPM | OXYGEN SATURATION: 96 %

## 2018-01-13 LAB
BASOPHILS # BLD: 0.02 K/UL (ref 0–0.2)
BASOPHILS NFR BLD: 0.3 %
BUN SERPL-MCNC: 11 MG/DL (ref 7–18)
CALCIUM SERPL-MCNC: 8 MG/DL (ref 8.5–10.1)
CO2 SERPL-SCNC: 28 MMOL/L (ref 21–32)
CREAT SERPL-MCNC: 0.84 MG/DL (ref 0.6–1.4)
EOS ABS #: 0.28 K/UL (ref 0–0.5)
EOSINOPHIL NFR BLD AUTO: 219 K/UL (ref 130–400)
GLUCOSE SERPL-MCNC: 101 MG/DL (ref 70–99)
HCT VFR BLD CALC: 32 % (ref 42–52)
HGB BLD-MCNC: 10.7 G/DL (ref 14–18)
IG#: 0.02 K/UL (ref 0–0.02)
IMM GRANULOCYTES NFR BLD AUTO: 12.8 %
LYMPHOCYTES # BLD: 0.85 K/UL (ref 1.2–3.4)
MCH RBC QN AUTO: 31 PG (ref 25–34)
MCHC RBC AUTO-ENTMCNC: 33.4 G/DL (ref 32–36)
MCV RBC AUTO: 92.8 FL (ref 80–100)
MONO ABS #: 0.66 K/UL (ref 0.11–0.59)
MONOCYTES NFR BLD: 9.9 %
NEUT ABS #: 4.82 K/UL (ref 1.4–6.5)
NEUTROPHILS # BLD AUTO: 4.2 %
NEUTROPHILS NFR BLD AUTO: 72.5 %
PMV BLD AUTO: 10 FL (ref 7.4–10.4)
POTASSIUM SERPL-SCNC: 3.7 MMOL/L (ref 3.5–5.1)
RED CELL DISTRIBUTION WIDTH CV: 13.7 % (ref 11.5–14.5)
RED CELL DISTRIBUTION WIDTH SD: 46.1 FL (ref 36.4–46.3)
SODIUM SERPL-SCNC: 141 MMOL/L (ref 136–145)
WBC # BLD AUTO: 6.65 K/UL (ref 4.8–10.8)

## 2018-01-13 RX ADMIN — CLONIDINE HYDROCHLORIDE SCH MG: 0.1 TABLET ORAL at 08:28

## 2018-01-13 RX ADMIN — Medication SCH ML: at 06:09

## 2018-01-13 RX ADMIN — METRONIDAZOLE SCH MLS/HR: 500 INJECTION, SOLUTION INTRAVENOUS at 03:39

## 2018-01-13 RX ADMIN — METOPROLOL TARTRATE SCH MG: 50 TABLET, FILM COATED ORAL at 08:27

## 2018-01-13 RX ADMIN — APIXABAN SCH MG: 2.5 TABLET, FILM COATED ORAL at 08:27

## 2018-01-13 RX ADMIN — Medication SCH ML: at 12:13

## 2018-01-13 RX ADMIN — VANCOMYCIN HYDROCHLORIDE SCH MG: 1 INJECTION, POWDER, LYOPHILIZED, FOR SOLUTION INTRAVENOUS at 01:02

## 2018-01-13 RX ADMIN — CHOLESTYRAMINE SCH GM: 4 POWDER, FOR SUSPENSION ORAL at 11:00

## 2018-01-13 RX ADMIN — Medication SCH ML: at 01:01

## 2018-01-13 RX ADMIN — MEMANTINE HYDROCHLORIDE SCH MG: 10 TABLET ORAL at 08:25

## 2018-01-13 RX ADMIN — VANCOMYCIN HYDROCHLORIDE SCH MG: 1 INJECTION, POWDER, LYOPHILIZED, FOR SOLUTION INTRAVENOUS at 12:13

## 2018-01-13 RX ADMIN — METRONIDAZOLE SCH MLS/HR: 500 INJECTION, SOLUTION INTRAVENOUS at 12:14

## 2018-01-13 RX ADMIN — VANCOMYCIN HYDROCHLORIDE SCH MG: 1 INJECTION, POWDER, LYOPHILIZED, FOR SOLUTION INTRAVENOUS at 06:10

## 2018-01-13 NOTE — DISCHARGE SUMMARY
Discharge Summary


Date of Service


Jan 13, 2018.





Discharge Summary


Admission Date:


Jan 12, 2018 at 10:51


Discharge Date:  Jan 13, 2018


Discharge Disposition:  Skilled nursing facility


Principal Diagnosis:  Cdiff colitis


Immunizations:  


   Have You Had Influenza Vaccine:  Unknown


   History of Tetanus Vaccine?:  Unknown


   History of Pneumococcal:  Unknown


   History of Hepatitis B Vaccine:  Unknown


Procedures:


CT abd/pelvis showing colitis as well as renal pelvis lesion concerning for 

possible malignancy





Medication Reconciliation


New Medications:  


Cholestyramine (Cholestyramine Light) 4 Gm Pack


4 GM PO BID@10,22 for 7 Days





Vancomycin HCl (Vancomycin HCl + Syrspend) 50 Mg/Ml Clara


250 MG PO Q6H for 14 Days





 


Continued Medications:  


Acetaminophen (Tylenol) 325 Mg Tab


650 MG PO Q6H PRN for Pain or Fever, TAB


AS NEEDED FOR MILD PAIN OR TEMPERATURE GREATER THAN 100 F. DO NOT


 EXCEED 3 GMS APAP/24 HOURS


Apixaban (Eliquis) 5 Mg Tab


5 MG PO BID, TAB





Aspirin (Aspirin Adult Low Dose) 81 Mg Tab


81 MG PO QAM





Atorvastatin (Lipitor) 10 Mg Tab


10 MG PO DAILY AT 1700, TAB





Bisacodyl (Bisac-Evac) 10 Mg Supp


10 MG KS DAILY


give if no bowel movement in 3 days. give on 3-11 shift if milk of


 mag not effective


Citalopram Hydrobromide (Citalopram Hydrobromide) 10 Mg Tab


10 MG PO HS for 90 Days, TAB 3 Refills





Clonidine Hcl (Catapres) 0.1 Mg Tab


0.1 MG PO Q8, TAB


give for systolic BP greater or equal to 170


Clonidine Hcl (Catapres) 0.2 Mg Tab


0.2 MG PO Q12 for 30 Days, #60 TAB 5 Refills


for hypertension. hold if pressure is less than or equal to 120/60


Diltiazem Hcl Ext Rel (Tiazac) 360 Mg Capcr


360 MG PO QAM, CAP





Donepezil Hydrochloride (Aricept) 10 Mg Tab


10 MG PO HS, TAB





Finasteride (Proscar) 5 Mg Tab


5 MG PO QAM, TAB





Isosorbide Mononitrate Ext Rel (Imdur Ext Rel) 30 Mg Ertab


30 MG PO QAM, TAB





Magnesium Hydroxide (Milk Of Magnesia) 30 Ml Susp


30 ML PO, ML


give milk of mag as needed for constipation after no bowel movement


 for 3 days. administer on 7-3 shift


Memantine (Namenda) 10 Mg Tab


10 MG PO BID, TAB





Metoprolol Tartrate (Lopressor) (Lopressor) 50 Mg Tab


75 MG PO BID, TAB


TAKES 1 1/2 TABS FOR TOTAL 75MG.


Pantoprazole (Pantoprazole Sodium) 40 Mg Tab


40 MG PO QAM for 30 Days, #30 TAB





Promethazine Hcl (Phenergan) 25 Mg Tab


25 MG PO Q6H PRN for Nausea, TAB





Sodium Phosphates (Fleet Enema Six Pack) 1 Ashlee Ashlee


1 BTL KS


give for constipation if no bm after dulcolax supp. give on 7-3 shift


 on day 4 of no bowel movement


Tamsulosin Hcl (Flomax) 0.4 Mg Cap


0.4 MG PO HS, CAP











Discharge Exam


Physical Exam:  


   General Appearance:  no apparent distress


   Eyes:  EOMI


   Neck:  trachea midline


   Respiratory/Chest:  no respiratory distress, no accessory muscle use


   Abdomen / GI:  soft, + tenderness (mild diffuse no guarding no rebound no 

rigidity no focal findings)


   Neurologic/Psychiatric:  CNs II-XII nml as tested, alert





Hospital Course


from admission:  This is a 86 yo M with PMHx of afib, tachy oly syndrome s/p 

pacemaker on Eliquis, HTN, HLD, moderate to severe dementia with hx of 

depression with suicidal ideation, and BPH who presents from [facility] to 

our facility for increased diarrhea over the past few days.  The patient has 

recently been admitted to [facility] for a UTI with behavioral distrubances at the 

[place], where he was discharged to [facility] on 12/20/17 with a 10 day course 

of linezolid for UTI, due to multiple organism resistance.  The [facility] where he was noted 

to having increased abdominal pain, nausea, vomiting, and excessive diarrhea.  

The patient reports he is having multiple bouts of diarrhea still, and that 

stools are loose and watery.  He has a decreased appetite.  Per nursing home 

notes, a c. diff culture was collected and is positive.  








Colitis, C. difficile


- no instability, has diffuse colitis but no megacolon, no sepsis


- PO vanco - improving quickly - stable for return to SNF





Hypokalemia


- Replaced - follow BMP through the coming week





Left Renal Mass


- CT finding


- May require urology consultation as this appears to be a new finding, 6 mm 

nonobstructing calculus also present but pt denies urinary symptoms. 


- further w/u would be of course based on any pre-stated preferences by pt or 

by family discussion





Afib


- Continue Eliquis 





HTN


- Continue clonidine 0.2 mg Q12H, pt does have home med of 0.1 mg Q8H prn for 

systolic > 170, will monitor for now. 





HLD


- Continue statin therapy





DVT ppx: Eliquis, teds, scds





CODE STATUS: DNR





stable for return to SNF





i personally examined pt and verified all key points bianca Driscoll PAC


abdominal pain about the same


vitals noted nad breathing unlabored no pallor or icterus


Cdiff colitis - flagyl, supportive care


otherwise as above


Total Time Spent:  Less than 30 minutes


This includes examination of the patient, discharge planning, medication 

reconciliation, and communication with other providers.





Discharge Instructions


Please refer to the electronic Patient Visit Report (Discharge Instructions) 

for additional information.

## 2018-01-13 NOTE — DISCHARGE INSTRUCTIONS
Discharge Instructions


Date of Service


Jan 13, 2018.





Admission


Reason for Admission:  Clostridium Difficile Colitis, Hypokalemia





Discharge


Discharge Diagnosis / Problem:  Cdiff colitis





Discharge Goals


Goal(s):  Diagnostic testing, Therapeutic intervention





Activity Recommendations


Activity Level:  Assistance Required





.





Additional Information


Patient informed of condition:  Yes


Advance Directives:  Yes


DNR:  Yes


Level of Care:  Skilled


Communicable Disease:  Yes (Cdiff - under treatment)


Prognosis:  Improving





Instructions / Follow-Up


Instructions / Follow-Up


Cdiff colitis


-no sepsis, no megacolon, maintaining volume status with PO intake, diarrhea 

improving


-presumptively treat for 2 weeks with PO vancomycin, then obviously follow for 

recurrence


   -using cholestyramine separate from vanco in order to bind toxin and slow 

diarrhea - presumptively for 7 days then hopefully can stop





-follow BMP, CBC in ~3 days, then as clinically warranted





Current Hospital Diet


Patient's current hospital diet: AHA Diet (Heart Healthy)





Discharge Diet


Recommended Diet:  AHA Diet (Heart Healthy)





Pending Studies


Studies pending at discharge:  no





Laboratory Results





Lipid Panel








Test


  10/28/17


04:25 Range/Units


 


 


Triglycerides Level 99  0-150  mg/dl


 


Cholesterol Level 125  0-200  mg/dl


 


HDL Cholesterol 30   mg/dl


 


Cholesterol/HDL Ratio 4.2   


 


LDL Cholesterol, Calculated 75   mg/dl











Medical Emergencies








.


Who to Call and When:





Medical Emergencies:  If at any time you feel your situation is an emergency, 

please call 911 immediately.





.





Non-Emergent Contact


Non-Emergency issues call your:  Primary Care Provider





.


.








"Provider Documentation" section prepared by Sudhakar Burk.








.





Core Measure Problem


Core Measures:  None

## 2018-01-17 ENCOUNTER — HOSPITAL ENCOUNTER (OUTPATIENT)
Dept: HOSPITAL 45 - C.CTS | Age: 83
Discharge: HOME | End: 2018-01-17
Attending: INTERNAL MEDICINE
Payer: COMMERCIAL

## 2018-01-17 DIAGNOSIS — N28.1: ICD-10-CM

## 2018-01-17 DIAGNOSIS — N20.0: Primary | ICD-10-CM

## 2018-01-17 NOTE — DIAGNOSTIC IMAGING REPORT
CT ABDOMEN COMBO



CT DOSE: 1642.41 mGycm



CLINICAL HISTORY: Renal mass    



TECHNIQUE: Unenhanced images were obtained through the upper abdomen. The

patient was then scanned in a dynamic helical fashion during intravenous

administration of 93 cc of Optiray 320. 5 minute delayed images were acquired.

A dose lowering technique was utilized adhering to the principles of ALARA.





COMPARISON STUDY:  1/12/2018



FINDINGS: 



At the lung bases, there are fiber nodular opacities within the right lower

lobe, likely postinflammatory. There is a trace right pleural effusion.



No hepatic masses are visualized. The portal vein is patent.



No gallbladder abnormalities are visualized.



There are few splenic calcifications. No splenic masses are visualized.



No pancreatic masses are visualized.



No adrenal masses are visualized.



There is a 6 mm nonobstructing left renal calculus. There is a 12 mm right renal

cortical cyst. There is a 36 mm mid pole left renal cyst. Several left

parapelvic cysts are visualized as well. These efface the left renal collecting

system. There is mild infiltration of the left peripelvic fat. No discrete

collecting system filling defects are visualized.



There is no evidence of abdominal aortic dilatation.



The upper abdominal bowel is unremarkable in appearance.



There is no pathologic upper abdominal adenopathy



IMPRESSION:  

1. Bilateral renal cysts including left-sided parapelvic cysts. No solid renal

masses identified.

2. Mild nonspecific infiltration of the left renal sinus fat

3. No evidence of hydronephrosis.

4. Trace right pleural effusion 







Electronically signed by:  Sean Randhawa M.D.

1/17/2018 8:19 AM



Dictated Date/Time:  1/17/2018 8:10 AM

## 2018-01-30 ENCOUNTER — HOSPITAL ENCOUNTER (OUTPATIENT)
Dept: HOSPITAL 45 - C.LABCC | Age: 83
Discharge: NURSING, REHAB OR LONG-TERM CARE | End: 2018-01-30
Attending: INTERNAL MEDICINE
Payer: COMMERCIAL

## 2018-01-30 DIAGNOSIS — R41.82: Primary | ICD-10-CM

## 2018-01-31 ENCOUNTER — HOSPITAL ENCOUNTER (OUTPATIENT)
Dept: HOSPITAL 45 - C.LABCC | Age: 83
Discharge: NURSING, REHAB OR LONG-TERM CARE | End: 2018-01-31
Attending: INTERNAL MEDICINE
Payer: COMMERCIAL

## 2018-01-31 DIAGNOSIS — R41.82: Primary | ICD-10-CM

## 2018-01-31 LAB
BUN SERPL-MCNC: 16 MG/DL (ref 7–18)
CALCIUM SERPL-MCNC: 8.8 MG/DL (ref 8.5–10.1)
CO2 SERPL-SCNC: 29 MMOL/L (ref 21–32)
CREAT SERPL-MCNC: 0.81 MG/DL (ref 0.6–1.4)
EOSINOPHIL NFR BLD AUTO: 171 K/UL (ref 130–400)
GLUCOSE SERPL-MCNC: 92 MG/DL (ref 70–99)
HCT VFR BLD CALC: 38.3 % (ref 42–52)
HGB BLD-MCNC: 12.6 G/DL (ref 14–18)
MCH RBC QN AUTO: 31.3 PG (ref 25–34)
MCHC RBC AUTO-ENTMCNC: 32.9 G/DL (ref 32–36)
MCV RBC AUTO: 95 FL (ref 80–100)
PMV BLD AUTO: 11.2 FL (ref 7.4–10.4)
POTASSIUM SERPL-SCNC: 3.9 MMOL/L (ref 3.5–5.1)
RED CELL DISTRIBUTION WIDTH CV: 15.3 % (ref 11.5–14.5)
RED CELL DISTRIBUTION WIDTH SD: 52.5 FL (ref 36.4–46.3)
SODIUM SERPL-SCNC: 141 MMOL/L (ref 136–145)
WBC # BLD AUTO: 6.34 K/UL (ref 4.8–10.8)

## 2018-02-05 ENCOUNTER — HOSPITAL ENCOUNTER (OUTPATIENT)
Dept: HOSPITAL 45 - C.LABCC | Age: 83
Discharge: NURSING, REHAB OR LONG-TERM CARE | End: 2018-02-05
Attending: INTERNAL MEDICINE
Payer: COMMERCIAL

## 2018-02-05 DIAGNOSIS — A04.72: Primary | ICD-10-CM

## 2018-04-12 ENCOUNTER — HOSPITAL ENCOUNTER (OUTPATIENT)
Dept: HOSPITAL 45 - C.LABCC | Age: 83
Discharge: NURSING, REHAB OR LONG-TERM CARE | End: 2018-04-12
Attending: INTERNAL MEDICINE
Payer: COMMERCIAL

## 2018-04-12 DIAGNOSIS — R19.7: Primary | ICD-10-CM

## 2018-04-27 ENCOUNTER — HOSPITAL ENCOUNTER (OUTPATIENT)
Dept: HOSPITAL 45 - C.LABCC | Age: 83
Discharge: NURSING, REHAB OR LONG-TERM CARE | End: 2018-04-27
Attending: INTERNAL MEDICINE
Payer: COMMERCIAL

## 2018-04-27 DIAGNOSIS — Z79.2: ICD-10-CM

## 2018-04-27 DIAGNOSIS — Z51.81: Primary | ICD-10-CM

## 2018-04-27 DIAGNOSIS — A04.72: ICD-10-CM

## 2018-04-27 LAB
BUN SERPL-MCNC: 14 MG/DL (ref 7–18)
CALCIUM SERPL-MCNC: 8.5 MG/DL (ref 8.5–10.1)
CO2 SERPL-SCNC: 30 MMOL/L (ref 21–32)
CREAT SERPL-MCNC: 0.76 MG/DL (ref 0.6–1.4)
GLUCOSE SERPL-MCNC: 112 MG/DL (ref 70–99)
POTASSIUM SERPL-SCNC: 4.1 MMOL/L (ref 3.5–5.1)
SODIUM SERPL-SCNC: 140 MMOL/L (ref 136–145)

## 2018-08-06 ENCOUNTER — HOSPITAL ENCOUNTER (OUTPATIENT)
Dept: HOSPITAL 45 - C.LABCC | Age: 83
Discharge: NURSING, REHAB OR LONG-TERM CARE | End: 2018-08-06
Attending: INTERNAL MEDICINE
Payer: COMMERCIAL

## 2018-08-06 DIAGNOSIS — J18.0: Primary | ICD-10-CM

## 2018-08-06 LAB
BASOPHILS # BLD: 0.02 K/UL (ref 0–0.2)
BASOPHILS NFR BLD: 0.4 %
BUN SERPL-MCNC: 15 MG/DL (ref 7–18)
CALCIUM SERPL-MCNC: 8.4 MG/DL (ref 8.5–10.1)
CO2 SERPL-SCNC: 32 MMOL/L (ref 21–32)
CREAT SERPL-MCNC: 0.76 MG/DL (ref 0.6–1.4)
EOS ABS #: 0.26 K/UL (ref 0–0.5)
EOSINOPHIL NFR BLD AUTO: 155 K/UL (ref 130–400)
GLUCOSE SERPL-MCNC: 87 MG/DL (ref 70–99)
HCT VFR BLD CALC: 36.4 % (ref 42–52)
HGB BLD-MCNC: 11.8 G/DL (ref 14–18)
IG#: 0.01 K/UL (ref 0–0.02)
IMM GRANULOCYTES NFR BLD AUTO: 28.3 %
LYMPHOCYTES # BLD: 1.38 K/UL (ref 1.2–3.4)
MCH RBC QN AUTO: 30 PG (ref 25–34)
MCHC RBC AUTO-ENTMCNC: 32.4 G/DL (ref 32–36)
MCV RBC AUTO: 92.6 FL (ref 80–100)
MONO ABS #: 0.82 K/UL (ref 0.11–0.59)
MONOCYTES NFR BLD: 16.8 %
NEUT ABS #: 2.38 K/UL (ref 1.4–6.5)
NEUTROPHILS # BLD AUTO: 5.3 %
NEUTROPHILS NFR BLD AUTO: 49 %
PMV BLD AUTO: 10.6 FL (ref 7.4–10.4)
POTASSIUM SERPL-SCNC: 3.8 MMOL/L (ref 3.5–5.1)
RED CELL DISTRIBUTION WIDTH CV: 14.7 % (ref 11.5–14.5)
RED CELL DISTRIBUTION WIDTH SD: 50.1 FL (ref 36.4–46.3)
SODIUM SERPL-SCNC: 141 MMOL/L (ref 136–145)
WBC # BLD AUTO: 4.87 K/UL (ref 4.8–10.8)